# Patient Record
Sex: FEMALE | Race: WHITE | NOT HISPANIC OR LATINO | Employment: FULL TIME | URBAN - METROPOLITAN AREA
[De-identification: names, ages, dates, MRNs, and addresses within clinical notes are randomized per-mention and may not be internally consistent; named-entity substitution may affect disease eponyms.]

---

## 2017-04-03 ENCOUNTER — RECORDS - HEALTHEAST (OUTPATIENT)
Dept: LAB | Facility: CLINIC | Age: 57
End: 2017-04-03

## 2017-04-03 LAB
CHOLEST SERPL-MCNC: 199 MG/DL
FASTING STATUS PATIENT QL REPORTED: ABNORMAL
HDLC SERPL-MCNC: 70 MG/DL
LDLC SERPL CALC-MCNC: 71 MG/DL
TRIGL SERPL-MCNC: 291 MG/DL

## 2018-07-24 ENCOUNTER — RECORDS - HEALTHEAST (OUTPATIENT)
Dept: LAB | Facility: HOSPITAL | Age: 58
End: 2018-07-24

## 2018-07-25 LAB — LAMOTRIGINE SERPL-MCNC: 7.6 UG/ML (ref 2.5–15)

## 2019-07-31 ENCOUNTER — RECORDS - HEALTHEAST (OUTPATIENT)
Dept: LAB | Facility: HOSPITAL | Age: 59
End: 2019-07-31

## 2019-08-02 LAB — LAMOTRIGINE SERPL-MCNC: 8.7 UG/ML (ref 2.5–15)

## 2020-06-01 ENCOUNTER — RECORDS - HEALTHEAST (OUTPATIENT)
Dept: LAB | Facility: CLINIC | Age: 60
End: 2020-06-01

## 2020-06-01 LAB
ALBUMIN SERPL-MCNC: 4 G/DL (ref 3.5–5)
ALP SERPL-CCNC: 58 U/L (ref 45–120)
ALT SERPL W P-5'-P-CCNC: <9 U/L (ref 0–45)
ANION GAP SERPL CALCULATED.3IONS-SCNC: 10 MMOL/L (ref 5–18)
AST SERPL W P-5'-P-CCNC: 12 U/L (ref 0–40)
BILIRUB SERPL-MCNC: 0.2 MG/DL (ref 0–1)
BUN SERPL-MCNC: 14 MG/DL (ref 8–22)
CALCIUM SERPL-MCNC: 9.3 MG/DL (ref 8.5–10.5)
CHLORIDE BLD-SCNC: 105 MMOL/L (ref 98–107)
CHOLEST SERPL-MCNC: 216 MG/DL
CO2 SERPL-SCNC: 24 MMOL/L (ref 22–31)
CREAT SERPL-MCNC: 0.81 MG/DL (ref 0.6–1.1)
FASTING STATUS PATIENT QL REPORTED: ABNORMAL
GFR SERPL CREATININE-BSD FRML MDRD: >60 ML/MIN/1.73M2
GLUCOSE BLD-MCNC: 111 MG/DL (ref 70–125)
HDLC SERPL-MCNC: 79 MG/DL
LDLC SERPL CALC-MCNC: 112 MG/DL
POTASSIUM BLD-SCNC: 4.1 MMOL/L (ref 3.5–5)
PROT SERPL-MCNC: 6.5 G/DL (ref 6–8)
SODIUM SERPL-SCNC: 139 MMOL/L (ref 136–145)
TRIGL SERPL-MCNC: 125 MG/DL

## 2020-06-02 LAB — 25(OH)D3 SERPL-MCNC: 43.3 NG/ML (ref 30–80)

## 2020-07-07 ENCOUNTER — OFFICE VISIT (OUTPATIENT)
Dept: NEUROLOGY | Facility: CLINIC | Age: 60
End: 2020-07-07
Payer: COMMERCIAL

## 2020-07-07 VITALS
HEIGHT: 62 IN | SYSTOLIC BLOOD PRESSURE: 124 MMHG | BODY MASS INDEX: 23.19 KG/M2 | HEART RATE: 58 BPM | WEIGHT: 126 LBS | DIASTOLIC BLOOD PRESSURE: 75 MMHG

## 2020-07-07 DIAGNOSIS — G40.209 PARTIAL SYMPTOMATIC EPILEPSY WITH COMPLEX PARTIAL SEIZURES, NOT INTRACTABLE, WITHOUT STATUS EPILEPTICUS (H): Primary | ICD-10-CM

## 2020-07-07 PROBLEM — E78.5 HYPERLIPIDEMIA: Status: ACTIVE | Noted: 2020-07-07

## 2020-07-07 PROBLEM — K21.9 GASTROESOPHAGEAL REFLUX DISEASE: Status: ACTIVE | Noted: 2020-07-07

## 2020-07-07 PROCEDURE — 99214 OFFICE O/P EST MOD 30 MIN: CPT | Performed by: PSYCHIATRY & NEUROLOGY

## 2020-07-07 RX ORDER — LAMOTRIGINE 200 MG/1
200 TABLET ORAL 2 TIMES DAILY
Qty: 180 TABLET | Refills: 3 | Status: SHIPPED | OUTPATIENT
Start: 2020-07-07 | End: 2021-06-30

## 2020-07-07 RX ORDER — SIMVASTATIN 40 MG
40 TABLET ORAL AT BEDTIME
COMMUNITY
Start: 2020-05-27

## 2020-07-07 RX ORDER — LAMOTRIGINE 200 MG/1
TABLET ORAL
COMMUNITY
Start: 2019-07-31 | End: 2020-07-07

## 2020-07-07 ASSESSMENT — MIFFLIN-ST. JEOR: SCORE: 1104.78

## 2020-07-07 NOTE — LETTER
2020         RE: Yuliya Feliz  [address] [place] [address]        Dear Colleague,    Thank you for referring your patient, Yuliya Feliz, to the [facility]. Please see a copy of my visit note below.    NEUROLOGY FOLLOW UP VISIT  NOTE       [facility] NEUROLOGY [place]  [address] [place] [address]  Tel: [phone]  Fax: [phone]  [email or web address]     Yuliya Feliz,  1960, MRN [id number]  PCP: [place], [phone]  Date: 2020     ASSESSMENT & PLAN     Diagnosis code: Complex partial seizure     Complex partial seizure  Pleasant 60-year-old female with history of complex partial seizures well-controlled on current dose of Lamictal.  She has remained seizure-free since .  I have recommended:    Continue Lamictal 200 mg twice daily.  I gave her 90-day supply with 3 refills    Check Lamictal level    Follow-up in 1 year    Thank you again for this referral, please feel free to contact me if you have any questions.    Jim Hahn MD  [facility]  (Formerly, [facility]     HISTORY OF PRESENT ILLNESS     Patient is 60-year-old female with history of complex partial seizures who returns for yearly follow-up.  She is currently on Lamictal with good control of her seizure.  There is no history of loss of consciousness, tonic-clonic activity, automatism or losing touch with her surrounding.  Work-up in the past included a normal MRI but EEG showed left hemispheric sharp activity.  She has been on Lamictal without any major side effects or any breakthrough seizure.    Briefly patient is a female with a history of hyperlipidemia and gastroesophageal reflux who is being followed in our clinic for complex partial seizure disorder. Her symptoms started in  when she had a nocturnal seizure and at that time had MRI and EEG that were unremarkable. She was started on  Keppra for a short duration and afterwards was discontinued. She was doing fine until 2014 when she woke up, and around 8:40 a.m. she went into the bathroom, and afterwards her  heard her hitting the floor. He found her unconscious without significant tonic-clonic activity. She was unconscious for about 10 minutes and was confused. She was taken to the hospital and had a CT of the head that was unremarkable. EEG at that time showed dysrhythmia grade III left temporal and MRI of the head showed the left hippocampal body slightly smaller than the right and likely a normal variant. She was started on Lamictal with good control of seizures     PROBLEM LIST   Patient Active Problem List   Diagnosis Code     Epilepsy with partial complex seizures (H) G40.209     Gastroesophageal reflux disease K21.9     Hyperlipidemia E78.5         PAST MEDICAL & SURGICAL HISTORY     Past Medical History:   Patient  has a past medical history of Seizures (H).    Surgical History:  She  has a past surgical history that includes GYN surgery.     SOCIAL HISTORY     Reviewed, and she  reports that she has never smoked. She has never used smokeless tobacco. She reports previous alcohol use. She reports that she does not use drugs.     FAMILY HISTORY     Reviewed, and family history includes Alcoholism in her father; Cancer in her brother and mother; Parkinsonism in her paternal aunt; Seizure Disorder in her father.     ALLERGIES     Allergies   Allergen Reactions     Cephalexin      Latex      Naproxen          REVIEW OF SYSTEMS     A 12 point review of system was performed and was negative except as outlined in the history of present illness.     HOME MEDICATIONS       Current Outpatient Medications:      lamoTRIgine (LAMICTAL) 200 MG tablet, , Disp: , Rfl:      magnesium oxide (MAG-OX) 100 mg TABS quarter-tab, , Disp: , Rfl:      melatonin 1 MG CAPS, , Disp: , Rfl:      simvastatin (ZOCOR) 20 MG tablet, , Disp: , Rfl:       PHYSICAL  "EXAM     Vital signs  /75 (BP Location: Left arm, Patient Position: Sitting, Cuff Size: Adult Regular)   Pulse 58   Ht 1.575 m (5' 2\")   Wt 57.2 kg (126 lb)   BMI 23.05 kg/m      Weight:   126 lbs 0 oz    GENERAL PHYSICAL EXAM: Patient is alert and oriented x 4 in no acute distress. Neck was supple, no carotid bruits, thyromegaly, lymphadenopathy or JVD noted.     NEUROLOGICAL EXAM:  MENTAL STATUS  Patient is A&O x 4. Patient recalls 3/3 objects at 5 minutes.  SPEECH  Speech is clear and fluent with good repetition, comprehension, and naming both for objects and parts of an object. Written and verbal comprehension is intact.  CRANIAL NERVES  CN II: Visual fields are full to confrontation. Fundoscopic exam is normal with sharp discs and no vascular changes. Venous pulsations are present bilaterally. Pupils are equal and reactive to light.   CN III, IV, VI: EOMI, PERRLA  CN V: Facial sensation is intact to pinprick in all 3 divisions bilaterally. Corneal responses are intact.  CN VII: Face is symmetric with normal eye closure and smile.  CN VII: Hearing is normal to rubbing fingers  CN IX, X: Palate elevates symmetrically. Phonation is normal.  CN XI: Head turning and shoulder shrug are intact  CN XII: Tongue is midline with normal movements and no atrophy.  MOTOR  Muscle bulk and tone are normal. No pronator drift. Strength is 5/5 bilaterally. No fasciculations noted.  REFLEXES  Reflexes are symmetric. Plantar responses are flexor.  SENSORY  Light touch, pinprick, position sense, and vibration sense are intact bilaterally. No astereognosia, agraphesthesia or extinction to bilateral simultaneous stimulation.  COORDINATION  Rapid alternating movements and fine finger movements are intact. No dysmetria on FNF and HKS. Romberg negative.  GAIT  Posture is normal.Tandem gait is normal. Able to walk on toes and heels.     DIAGNOSTIC STUDIES     PERTINENT RADIOLOGY  Following imaging studies were reviewed "     (05/10/2017 09:02 CDT MRI Report)  1. No recent infarct, intracranial mass or abnormal intracranial enhancement.  2. No structural or migrational abnormality identified.  3. Within the limits of motion on the study, the mesial temporal structures are within normal limits bilaterally.  4. Stable mild number of small foci of nonspecific nonenhancing T2 signal changes in the supratentorial white matter which is not atypical in a patient of this age. These may reflect foci of nonspecific gliosis or chronic myelin loss or mild chronic small vessel ischemic changes.    EEG 5/9/17: This is abnormal sleep deprived EEG due to paroxysmal slowing of the background in theta range that is maximally expressed in the left hemisphere and suggests nonspecific cerebral dysfunction with predominantly left hemispheric pathology. No sharp activity or rhythmic discharges were seen.    PERTINENT LABS  Following labs were reviewed  Result Name Current Result Reference Range   Lamotrigine (Lamictal) Level (ug/mL)  8.7 7/31/2019 2.5 - 15.0            Total time spent for face to face visit, reviewing labs/imaging studies, counseling and coordination of care was: 30 Minutes More than 50% of this time was spent on counseling and coordination of care.      This note was dictated using voice recognition software.  Any grammatical or context distortions are unintentional and inherent to the software.               Again, thank you for allowing me to participate in the care of your patient.        Sincerely,        Jim Hahn MD

## 2020-07-07 NOTE — PATIENT INSTRUCTIONS
Patient Education     Diagnosing Epilepsy    Your primary healthcare provider may be the first healthcare provider to evaluate you for epilepsy. He or she may then refer you to a specialist for further evaluation. This specialist may be a neurologist (a healthcare provider who treats the brain), or an epileptologist, a neurologist who specializes in seizure disorders. Your evaluation will include a health history, physical and neurologic exams, and tests. An epilepsy diagnosis is typically made when someone has more than 1 seizure that occurs for no apparent reason (an unprovoked seizure).  Health history  This is the most important part of your evaluation. The healthcare provider will ask you to describe your seizures. The healthcare provider may also want to talk to family or friends who have seen your seizures. In addition, your healthcare provider will ask about your risk factors. These are things that make you more likely to have epilepsy, and include:    Being born before your due date (premature birth)    Oxygen deprivation during birth     A family history of epilepsy    Past nervous system infection, like meningitis    A previous head or brain injury    Past stroke or brain tumor    A history of febrile seizures (childhood seizures caused by high fever)    Use of illegal drugs or alcohol    Certain genetic disorders    Alcohol abuse or withdrawal    Alzheimer disease    Gluten intolerance or celiac disease    Hydrocephalus or an abnormal buildup of fluid around the brain    Withdrawal of antiepileptic medicines, even when they are used for other conditions, such as gabapentin for pain   Physical and neurologic exams  The physical exam checks your overall health. Your pulse, blood pressure, and temperature are taken. The neurologic exam checks certain functions of your brain. These include reflexes, balance, muscle strength, and coordination. Mental skills, like language and memory, and nerve function of  the body are also checked.  Tests for epilepsy  After the exams are done, the healthcare provider may order some tests. Electroencephalogram (EEG) and MRI are the most common tests used to support a diagnosis of epilepsy.  Electroencephalogram (EEG)  An EEG records electrical activity in the brain. It can show abnormal signals that may mean seizure activity. In some cases, it can point to the area of the brain where seizures might start.  Imaging tests  Imaging tests may be used to create detailed pictures of the brain. These tests include MRI and CT scans.  Blood tests and other tests  You may have a sample of blood taken and tested. Other tests may also be done. These tests can help rule out certain health problems or provide more information.  Date Last Reviewed: 11/1/2017 2000-2019 [employer]. [address] [place] [address], [place] [address]. All rights reserved. This information is not intended as a substitute for professional medical care. Always follow your healthcare professional's instructions.

## 2020-07-07 NOTE — LETTER
July 10, 2020      Yuliya Feliz  [address] [place] [address]        Dear ,    We are writing to inform you of your test results.    Your test results fall within the expected range(s) or remain unchanged from previous results.  Please continue with current treatment plan.     Ref Range & Units  2d ago 11mo ago   Lamotrigine  2.5 - 15.0 ug/mL  9.6   8.7 CM      If you have any questions or concerns, please call the clinic at the number listed above.       Sincerely,        Jim Hahn MD

## 2020-07-07 NOTE — PROGRESS NOTES
NEUROLOGY FOLLOW UP VISIT  NOTE       [facility] NEUROLOGY [place]  [address] [place] [address]  Tel: [phone]  Fax: [phone]  [email or web address]     Yuliya Feliz, [place] 1960, MRN [id number]  PCP: [place], [phone]  Date: 2020     ASSESSMENT & PLAN     Diagnosis code: Complex partial seizure     Complex partial seizure  Pleasant 60-year-old female with history of complex partial seizures well-controlled on current dose of Lamictal.  She has remained seizure-free since .  I have recommended:    Continue Lamictal 200 mg twice daily.  I gave her 90-day supply with 3 refills    Check Lamictal level    Follow-up in 1 year    Thank you again for this referral, please feel free to contact me if you have any questions.    Jim Hahn MD  [facility]  (Former[facility], [facility]     HISTORY OF PRESENT ILLNESS     Patient is 60-year-old female with history of complex partial seizures who returns for yearly follow-up.  She is currently on Lamictal with good control of her seizure.  There is no history of loss of consciousness, tonic-clonic activity, automatism or losing touch with her surrounding.  Work-up in the past included a normal MRI but EEG showed left hemispheric sharp activity.  She has been on Lamictal without any major side effects or any breakthrough seizure.    Briefly patient is a female with a history of hyperlipidemia and gastroesophageal reflux who is being followed in our clinic for complex partial seizure disorder. Her symptoms started in  when she had a nocturnal seizure and at that time had MRI and EEG that were unremarkable. She was started on Keppra for a short duration and afterwards was discontinued. She was doing fine until  when she woke up, and around 8:40 a.m. she went into the bathroom, and afterwards her  heard her hitting the floor. He found her unconscious without  "significant tonic-clonic activity. She was unconscious for about 10 minutes and was confused. She was taken to the hospital and had a CT of the head that was unremarkable. EEG at that time showed dysrhythmia grade III left temporal and MRI of the head showed the left hippocampal body slightly smaller than the right and likely a normal variant. She was started on Lamictal with good control of seizures     PROBLEM LIST   Patient Active Problem List   Diagnosis Code     Epilepsy with partial complex seizures (H) G40.209     Gastroesophageal reflux disease K21.9     Hyperlipidemia E78.5         PAST MEDICAL & SURGICAL HISTORY     Past Medical History:   Patient  has a past medical history of Seizures (H).    Surgical History:  She  has a past surgical history that includes GYN surgery.     SOCIAL HISTORY     Reviewed, and she  reports that she has never smoked. She has never used smokeless tobacco. She reports previous alcohol use. She reports that she does not use drugs.     FAMILY HISTORY     Reviewed, and family history includes Alcoholism in her father; Cancer in her brother and mother; Parkinsonism in her paternal aunt; Seizure Disorder in her father.     ALLERGIES     Allergies   Allergen Reactions     Cephalexin      Latex      Naproxen          REVIEW OF SYSTEMS     A 12 point review of system was performed and was negative except as outlined in the history of present illness.     HOME MEDICATIONS       Current Outpatient Medications:      lamoTRIgine (LAMICTAL) 200 MG tablet, , Disp: , Rfl:      magnesium oxide (MAG-OX) 100 mg TABS quarter-tab, , Disp: , Rfl:      melatonin 1 MG CAPS, , Disp: , Rfl:      simvastatin (ZOCOR) 20 MG tablet, , Disp: , Rfl:       PHYSICAL EXAM     Vital signs  /75 (BP Location: Left arm, Patient Position: Sitting, Cuff Size: Adult Regular)   Pulse 58   Ht 1.575 m (5' 2\")   Wt 57.2 kg (126 lb)   BMI 23.05 kg/m      Weight:   126 lbs 0 oz    GENERAL PHYSICAL EXAM: Patient is " alert and oriented x 4 in no acute distress. Neck was supple, no carotid bruits, thyromegaly, lymphadenopathy or JVD noted.     NEUROLOGICAL EXAM:  MENTAL STATUS  Patient is A&O x 4. Patient recalls 3/3 objects at 5 minutes.  SPEECH  Speech is clear and fluent with good repetition, comprehension, and naming both for objects and parts of an object. Written and verbal comprehension is intact.  CRANIAL NERVES  CN II: Visual fields are full to confrontation. Fundoscopic exam is normal with sharp discs and no vascular changes. Venous pulsations are present bilaterally. Pupils are equal and reactive to light.   CN III, IV, VI: EOMI, PERRLA  CN V: Facial sensation is intact to pinprick in all 3 divisions bilaterally. Corneal responses are intact.  CN VII: Face is symmetric with normal eye closure and smile.  CN VII: Hearing is normal to rubbing fingers  CN IX, X: Palate elevates symmetrically. Phonation is normal.  CN XI: Head turning and shoulder shrug are intact  CN XII: Tongue is midline with normal movements and no atrophy.  MOTOR  Muscle bulk and tone are normal. No pronator drift. Strength is 5/5 bilaterally. No fasciculations noted.  REFLEXES  Reflexes are symmetric. Plantar responses are flexor.  SENSORY  Light touch, pinprick, position sense, and vibration sense are intact bilaterally. No astereognosia, agraphesthesia or extinction to bilateral simultaneous stimulation.  COORDINATION  Rapid alternating movements and fine finger movements are intact. No dysmetria on FNF and HKS. Romberg negative.  GAIT  Posture is normal.Tandem gait is normal. Able to walk on toes and heels.     DIAGNOSTIC STUDIES     PERTINENT RADIOLOGY  Following imaging studies were reviewed     (05/10/2017 09:02 CDT MRI Report)  1. No recent infarct, intracranial mass or abnormal intracranial enhancement.  2. No structural or migrational abnormality identified.  3. Within the limits of motion on the study, the mesial temporal structures are  within normal limits bilaterally.  4. Stable mild number of small foci of nonspecific nonenhancing T2 signal changes in the supratentorial white matter which is not atypical in a patient of this age. These may reflect foci of nonspecific gliosis or chronic myelin loss or mild chronic small vessel ischemic changes.    EEG 5/9/17: This is abnormal sleep deprived EEG due to paroxysmal slowing of the background in theta range that is maximally expressed in the left hemisphere and suggests nonspecific cerebral dysfunction with predominantly left hemispheric pathology. No sharp activity or rhythmic discharges were seen.    PERTINENT LABS  Following labs were reviewed  Result Name Current Result Reference Range   Lamotrigine (Lamictal) Level (ug/mL)  8.7 7/31/2019 2.5 - 15.0            Total time spent for face to face visit, reviewing labs/imaging studies, counseling and coordination of care was: 30 Minutes More than 50% of this time was spent on counseling and coordination of care.      This note was dictated using voice recognition software.  Any grammatical or context distortions are unintentional and inherent to the software.

## 2020-07-08 ENCOUNTER — RECORDS - HEALTHEAST (OUTPATIENT)
Dept: LAB | Facility: HOSPITAL | Age: 60
End: 2020-07-08

## 2020-07-09 LAB — LAMOTRIGINE SERPL-MCNC: 9.6 UG/ML (ref 2.5–15)

## 2020-07-13 ENCOUNTER — RECORDS - HEALTHEAST (OUTPATIENT)
Dept: LAB | Facility: CLINIC | Age: 60
End: 2020-07-13

## 2020-07-13 LAB
ALBUMIN SERPL-MCNC: 4.1 G/DL (ref 3.5–5)
ALP SERPL-CCNC: 59 U/L (ref 45–120)
ALT SERPL W P-5'-P-CCNC: 10 U/L (ref 0–45)
ANION GAP SERPL CALCULATED.3IONS-SCNC: 8 MMOL/L (ref 5–18)
AST SERPL W P-5'-P-CCNC: 11 U/L (ref 0–40)
BILIRUB SERPL-MCNC: 0.3 MG/DL (ref 0–1)
BUN SERPL-MCNC: 8 MG/DL (ref 8–22)
CALCIUM SERPL-MCNC: 9.4 MG/DL (ref 8.5–10.5)
CHLORIDE BLD-SCNC: 100 MMOL/L (ref 98–107)
CO2 SERPL-SCNC: 28 MMOL/L (ref 22–31)
CREAT SERPL-MCNC: 0.78 MG/DL (ref 0.6–1.1)
GFR SERPL CREATININE-BSD FRML MDRD: >60 ML/MIN/1.73M2
GLUCOSE BLD-MCNC: 97 MG/DL (ref 70–125)
LIPASE SERPL-CCNC: 19 U/L (ref 0–52)
POTASSIUM BLD-SCNC: 4.4 MMOL/L (ref 3.5–5)
PROT SERPL-MCNC: 6.4 G/DL (ref 6–8)
SODIUM SERPL-SCNC: 136 MMOL/L (ref 136–145)
TSH SERPL DL<=0.005 MIU/L-ACNC: 0.68 UIU/ML (ref 0.3–5)

## 2020-07-15 ENCOUNTER — RECORDS - HEALTHEAST (OUTPATIENT)
Dept: LAB | Facility: CLINIC | Age: 60
End: 2020-07-15

## 2020-07-16 LAB
O+P STL MICRO: NORMAL
SHIGA TOXIN 1: NEGATIVE
SHIGA TOXIN 2: NEGATIVE

## 2020-07-17 LAB — H PYLORI AG STL QL IA: NEGATIVE

## 2020-07-18 LAB — BACTERIA SPEC CULT: NORMAL

## 2020-09-29 ENCOUNTER — TELEPHONE (OUTPATIENT)
Dept: NEUROLOGY | Facility: CLINIC | Age: 60
End: 2020-09-29

## 2020-09-29 NOTE — TELEPHONE ENCOUNTER
Pt lm late yesterday that she has numbness in the left side of her face and is falling a lot. [phone]

## 2020-09-30 NOTE — TELEPHONE ENCOUNTER
Unable to reach patient. I have left her a message asking to please call us back.  Glo Foreman CMA on 9/30/2020 at 9:34 AM

## 2021-03-25 ENCOUNTER — TELEPHONE (OUTPATIENT)
Dept: NEUROLOGY | Facility: CLINIC | Age: 61
End: 2021-03-25

## 2021-03-25 NOTE — TELEPHONE ENCOUNTER
Pt's  calling to ask if the COVID vaccine is safe for pt. Pt has epilepsy, and she had heard that there have been instances where people with epilepsy have had seizures after getting the vaccine. [phone] ok to lm

## 2021-03-25 NOTE — TELEPHONE ENCOUNTER
Called Gill and left message OK to get COVID vaccine   Glo Foreman, SUYAPA on 3/25/2021 at 10:53 AM

## 2021-06-02 ENCOUNTER — RECORDS - HEALTHEAST (OUTPATIENT)
Dept: ADMINISTRATIVE | Facility: CLINIC | Age: 61
End: 2021-06-02

## 2021-06-28 ENCOUNTER — TELEPHONE (OUTPATIENT)
Dept: NEUROLOGY | Facility: CLINIC | Age: 61
End: 2021-06-28

## 2021-06-28 NOTE — TELEPHONE ENCOUNTER
Pt's  Johny called to ask if using CBD oil is safe with Lamictal. Pt is having trouble sleeping, and wants to use it for relaxation and pain. He also asked a CBD personal lube would be ok for vaginal atrophy. [phone]

## 2021-06-29 NOTE — TELEPHONE ENCOUNTER
CBD oil can increase the level of lamotrigine.  She needs to talk to her primary care physician about vaginal atrophy and treatment option

## 2021-06-29 NOTE — TELEPHONE ENCOUNTER
"Johny called to add that it is ok to lm. I did inform him of Dr. Hahn\"s response. He understood.  "

## 2021-06-30 DIAGNOSIS — G40.209 PARTIAL SYMPTOMATIC EPILEPSY WITH COMPLEX PARTIAL SEIZURES, NOT INTRACTABLE, WITHOUT STATUS EPILEPTICUS (H): ICD-10-CM

## 2021-06-30 RX ORDER — LAMOTRIGINE 200 MG/1
TABLET ORAL
Qty: 60 TABLET | Refills: 0 | Status: SHIPPED | OUTPATIENT
Start: 2021-06-30 | End: 2021-07-12

## 2021-06-30 NOTE — TELEPHONE ENCOUNTER
Refill request for Lamictal  Appt scheduled for 7/12/21  Medication T'd for review and signature  Glo Foreman CMA on 6/30/2021 at 11:38 AM

## 2021-07-12 ENCOUNTER — OFFICE VISIT (OUTPATIENT)
Dept: NEUROLOGY | Facility: CLINIC | Age: 61
End: 2021-07-12
Payer: COMMERCIAL

## 2021-07-12 ENCOUNTER — LAB (OUTPATIENT)
Dept: LAB | Facility: HOSPITAL | Age: 61
End: 2021-07-12
Payer: COMMERCIAL

## 2021-07-12 VITALS
HEART RATE: 57 BPM | BODY MASS INDEX: 22.6 KG/M2 | WEIGHT: 122.8 LBS | SYSTOLIC BLOOD PRESSURE: 119 MMHG | DIASTOLIC BLOOD PRESSURE: 63 MMHG | HEIGHT: 62 IN

## 2021-07-12 DIAGNOSIS — G40.209 PARTIAL SYMPTOMATIC EPILEPSY WITH COMPLEX PARTIAL SEIZURES, NOT INTRACTABLE, WITHOUT STATUS EPILEPTICUS (H): ICD-10-CM

## 2021-07-12 DIAGNOSIS — G40.209 PARTIAL SYMPTOMATIC EPILEPSY WITH COMPLEX PARTIAL SEIZURES, NOT INTRACTABLE, WITHOUT STATUS EPILEPTICUS (H): Primary | ICD-10-CM

## 2021-07-12 DIAGNOSIS — M47.812 CERVICAL SPONDYLOSIS WITHOUT MYELOPATHY: ICD-10-CM

## 2021-07-12 LAB
ALBUMIN SERPL-MCNC: 4 G/DL (ref 3.5–5)
ALP SERPL-CCNC: 50 U/L (ref 45–120)
ALT SERPL W P-5'-P-CCNC: 14 U/L (ref 0–45)
AST SERPL W P-5'-P-CCNC: 14 U/L (ref 0–40)
BILIRUB DIRECT SERPL-MCNC: 0.1 MG/DL
BILIRUB SERPL-MCNC: 0.4 MG/DL (ref 0–1)
PROT SERPL-MCNC: 6.7 G/DL (ref 6–8)

## 2021-07-12 PROCEDURE — 82040 ASSAY OF SERUM ALBUMIN: CPT

## 2021-07-12 PROCEDURE — 99215 OFFICE O/P EST HI 40 MIN: CPT | Performed by: PSYCHIATRY & NEUROLOGY

## 2021-07-12 PROCEDURE — 80076 HEPATIC FUNCTION PANEL: CPT

## 2021-07-12 PROCEDURE — 36415 COLL VENOUS BLD VENIPUNCTURE: CPT

## 2021-07-12 PROCEDURE — 80175 DRUG SCREEN QUAN LAMOTRIGINE: CPT

## 2021-07-12 RX ORDER — LAMOTRIGINE 200 MG/1
200 TABLET ORAL 2 TIMES DAILY
Qty: 180 TABLET | Refills: 3 | Status: SHIPPED | OUTPATIENT
Start: 2021-07-12 | End: 2022-07-14

## 2021-07-12 RX ORDER — LECITHIN 1200 MG
CAPSULE ORAL
COMMUNITY

## 2021-07-12 ASSESSMENT — MIFFLIN-ST. JEOR: SCORE: 1085.27

## 2021-07-12 NOTE — PROGRESS NOTES
NEUROLOGY FOLLOW UP VISIT  NOTE       [facility] NEUROLOGY [place]  [address] [place] [address]  Tel: [phone]  Fax: [phone]  [email or web address]     Yuliya Feliz,  1960, MRN [id number]  PCP: [place]  Date: 2021      ASSESSMENT & PLAN     Diagnosis code  1. Partial symptomatic epilepsy with complex partial seizures, not intractable, without status epilepticus (H)     Complex partial seizure (dysrhythmia grade 3 left temporal)  Pleasant 61-year-old female with history of HLD, GERD and complex partial seizures who returns for yearly follow-up.  Her seizures are well controlled on current dose of lamotrigine.  I have refilled her prescriptions gave her 90-day supply with 3 refills.  I am checking hepatic profile and lamotrigine level.  Regular follow-up will be in 1 year.    Cervical spondylosis  Lately patient has been experiencing generalized muscle twitches along with incoordination.  She also reports some neck pain and on exam has significant hyperreflexia that is concerning for cervical spondylosis.  I have recommended checking MRI of cervical spine.  Further recommendation will be based on MRI findings    Thank you again for this referral, please feel free to contact me if you have any questions.    Jim Hahn MD  [facility]  (Former[facility], [facility]     HISTORY OF PRESENT ILLNESS     Patient is a pleasant 61-year-old female with history of HLD, GERD and complex partial seizures who is been followed in our clinic since  who returns for follow-up.  Her symptoms are well controlled on lamotrigine.  Since her last visit she denies any loss of consciousness, tonic-clonic activity or unexplained loss of consciousness.  Work-up in the past included a normal MRI scan but EEG showed left hemispheric sharp activity.  She was started on lamotrigine with no breakthrough seizures.  Her major  complaint today is neck pain along with involuntary muscles.  She has difficulty with coordination.  She denies any weakness or any bowel or bladder incontinence.    Briefly patient is a female with a history of hyperlipidemia and gastroesophageal reflux who is being followed in our clinic for complex partial seizure disorder. Her symptoms started in 2010 when she had a nocturnal seizure and at that time had MRI and EEG that were unremarkable. She was started on Keppra for a short duration and afterwards was discontinued. She was doing fine until 2014 when she woke up, and around 8:40 a.m. she went into the bathroom, and afterwards her  heard her hitting the floor. He found her unconscious without significant tonic-clonic activity. She was unconscious for about 10 minutes and was confused. She was taken to the hospital and had a CT of the head that was unremarkable. EEG at that time showed dysrhythmia grade III left temporal and MRI of the head showed the left hippocampal body slightly smaller than the right and likely a normal variant. She was started on Lamictal with good control of seizures     PROBLEM LIST   Patient Active Problem List   Diagnosis Code     Complex Partial Seizure (Dysrhythmia III Lt. Temporal) G40.209     Gastroesophageal reflux disease K21.9     Hyperlipidemia E78.5         PAST MEDICAL & SURGICAL HISTORY     Past Medical History:   Patient  has a past medical history of Seizures (H).    Surgical History:  She  has a past surgical history that includes GYN surgery.     SOCIAL HISTORY     Reviewed, and she  reports that she has never smoked. She has never used smokeless tobacco. She reports previous alcohol use. She reports that she does not use drugs.     FAMILY HISTORY     Reviewed, and family history includes Alcoholism in her father; Cancer in her brother and mother; Parkinsonism in her paternal aunt; Seizure Disorder in her father.     ALLERGIES     Allergies   Allergen Reactions      "Cephalexin      Latex      Naproxen          REVIEW OF SYSTEMS     A 12 point review of system was performed and was negative except as outlined in the history of present illness.     HOME MEDICATIONS     Current Outpatient Rx   Medication Sig Dispense Refill     Black Cohosh 160 MG CAPS        CALCIUM PO        Cholecalciferol (VITAMIN D3 PO)   0 Refill(s), Type: Maintenance       lamoTRIgine (LAMICTAL) 200 MG tablet Take 1 tablet (200 mg) by mouth 2 times daily 180 tablet 3     magnesium oxide (MAG-OX) 100 mg TABS quarter-tab        melatonin 1 MG CAPS        simvastatin (ZOCOR) 20 MG tablet        TURMERIC PO        VITAMIN B COMPLEX-C PO            PHYSICAL EXAM     Vital signs  /63 (BP Location: Left arm, Patient Position: Sitting)   Pulse 57   Ht 1.575 m (5' 2\")   Wt 55.7 kg (122 lb 12.8 oz)   BMI 22.46 kg/m      Weight:   122 lbs 12.8 oz    Patient is alert and oriented x4 in no acute distress. Vital signs were reviewed and are documented in electronic medical record. Neck was supple, no carotid bruits, thyromegaly, JVD, or lymphadenopathy was noted.   NEUROLOGY EXAM:    Patient s speech was normal with no aphasia or dysarthria. Mentation, and affect were also normal.     Funduscopic exam was normal, with normal cup to disc ratio. Cranial nerves II -XII were intact.     Patient had normal mass, tone and motor strength was 5/5 in all extremities without pronator drift.     Sensation was intact to light touch, pinprick, and vibratory sensation.     Reflexes were 3+ symmetrical with downgoing toes.     No dysmetria noted on FNF or HKS. Romberg was negative.    Gait testing was normal. Able to walk on toes/heels. Tandem walk normal.     DIAGNOSTIC STUDIES     PERTINENT RADIOLOGY  Following imaging studies were reviewed:     (05/10/2017 09:02 CDT MRI Report)  1. No recent infarct, intracranial mass or abnormal intracranial enhancement.  2. No structural or migrational abnormality identified.  3. Within " the limits of motion on the study, the mesial temporal structures are within normal limits bilaterally.  4. Stable mild number of small foci of nonspecific nonenhancing T2 signal changes in the supratentorial white matter which is not atypical in a patient of this age. These may reflect foci of nonspecific gliosis or chronic myelin loss or mild chronic small vessel ischemic changes.    EEG 5/9/17: This is abnormal sleep deprived EEG due to paroxysmal slowing of the background in theta range that is maximally expressed in the left hemisphere and suggests nonspecific cerebral dysfunction with predominantly left hemispheric pathology. No sharp activity or rhythmic discharges were seen.     PERTINENT LABS  Following labs were reviewed:  No visits with results within 3 Month(s) from this visit.   Latest known visit with results is:   Records - [facility] on 07/15/2020   Component Date Value     H Pylori Antigen 07/15/2020 Negative      Shiga Toxin 1 07/15/2020 Negative      Shiga Toxin 2 07/15/2020 Negative         Ref Range & Units 1 yr ago   (7/8/20) 1 yr ago   (7/31/19)    Lamotrigine 2.5 - 15.0 ug/mL 9.6  8.7 CM         Total time spent for face to face visit, reviewing labs/imaging studies, counseling and coordination of care was: 45 Minutes spent on the date of the encounter doing chart review, review of outside records, review of test results, interpretation of tests, patient visit and documentation       This note was dictated using voice recognition software.  Any grammatical or context distortions are unintentional and inherent to the software.    Orders Placed This Encounter   Procedures     MR Cervical Spine w/o Contrast     Lamotrigine Level     Hepatic function panel      New Prescriptions    No medications on file     Modified Medications    Modified Medication Previous Medication    LAMOTRIGINE (LAMICTAL) 200 MG TABLET lamoTRIgine (LAMICTAL) 200 MG tablet       Take 1 tablet (200 mg) by mouth 2 times  daily    TAKE ONE TABLET BY MOUTH TWICE DAILY

## 2021-07-12 NOTE — LETTER
2021         RE: Yuliya Feliz  [address] [place] [address]        Dear Colleague,    Thank you for referring your patient, Yuliya Feliz, to the [facility] NEUROLOGY [facility] [place]. Please see a copy of my visit note below.    NEUROLOGY FOLLOW UP VISIT  NOTE       [facility] NEUROLOGY [place]  [address] [place] [address]  Tel: [phone]  Fax: [phone]  [email or web address]     Yuliya Feliz,  1960, MRN [id number]  PCP: [place]  Date: 2021      ASSESSMENT & PLAN     Diagnosis code  1. Partial symptomatic epilepsy with complex partial seizures, not intractable, without status epilepticus (H)     Complex partial seizure (dysrhythmia grade 3 left temporal)  Pleasant 61-year-old female with history of HLD, GERD and complex partial seizures who returns for yearly follow-up.  Her seizures are well controlled on current dose of lamotrigine.  I have refilled her prescriptions gave her 90-day supply with 3 refills.  I am checking hepatic profile and lamotrigine level.  Regular follow-up will be in 1 year.    Cervical spondylosis  Lately patient has been experiencing generalized muscle twitches along with incoordination.  She also reports some neck pain and on exam has significant hyperreflexia that is concerning for cervical spondylosis.  I have recommended checking MRI of cervical spine.  Further recommendation will be based on MRI findings    Thank you again for this referral, please feel free to contact me if you have any questions.    Jim Hahn MD  [facility]  ([facility], [facility]     HISTORY OF PRESENT ILLNESS     Patient is a pleasant 61-year-old female with history of HLD, GERD and complex partial seizures who is been followed in our clinic since  who returns for follow-up.  Her symptoms are well controlled on lamotrigine.  Since her last visit she denies any loss  of consciousness, tonic-clonic activity or unexplained loss of consciousness.  Work-up in the past included a normal MRI scan but EEG showed left hemispheric sharp activity.  She was started on lamotrigine with no breakthrough seizures.  Her major complaint today is neck pain along with involuntary muscles.  She has difficulty with coordination.  She denies any weakness or any bowel or bladder incontinence.    Briefly patient is a female with a history of hyperlipidemia and gastroesophageal reflux who is being followed in our clinic for complex partial seizure disorder. Her symptoms started in 2010 when she had a nocturnal seizure and at that time had MRI and EEG that were unremarkable. She was started on Keppra for a short duration and afterwards was discontinued. She was doing fine until 2014 when she woke up, and around 8:40 a.m. she went into the bathroom, and afterwards her  heard her hitting the floor. He found her unconscious without significant tonic-clonic activity. She was unconscious for about 10 minutes and was confused. She was taken to the hospital and had a CT of the head that was unremarkable. EEG at that time showed dysrhythmia grade III left temporal and MRI of the head showed the left hippocampal body slightly smaller than the right and likely a normal variant. She was started on Lamictal with good control of seizures     PROBLEM LIST   Patient Active Problem List   Diagnosis Code     Complex Partial Seizure (Dysrhythmia III Lt. Temporal) G40.209     Gastroesophageal reflux disease K21.9     Hyperlipidemia E78.5         PAST MEDICAL & SURGICAL HISTORY     Past Medical History:   Patient  has a past medical history of Seizures (H).    Surgical History:  She  has a past surgical history that includes GYN surgery.     SOCIAL HISTORY     Reviewed, and she  reports that she has never smoked. She has never used smokeless tobacco. She reports previous alcohol use. She reports that she does not use  "drugs.     FAMILY HISTORY     Reviewed, and family history includes Alcoholism in her father; Cancer in her brother and mother; Parkinsonism in her paternal aunt; Seizure Disorder in her father.     ALLERGIES     Allergies   Allergen Reactions     Cephalexin      Latex      Naproxen          REVIEW OF SYSTEMS     A 12 point review of system was performed and was negative except as outlined in the history of present illness.     HOME MEDICATIONS     Current Outpatient Rx   Medication Sig Dispense Refill     Black Cohosh 160 MG CAPS        CALCIUM PO        Cholecalciferol (VITAMIN D3 PO)   0 Refill(s), Type: Maintenance       lamoTRIgine (LAMICTAL) 200 MG tablet Take 1 tablet (200 mg) by mouth 2 times daily 180 tablet 3     magnesium oxide (MAG-OX) 100 mg TABS quarter-tab        melatonin 1 MG CAPS        simvastatin (ZOCOR) 20 MG tablet        TURMERIC PO        VITAMIN B COMPLEX-C PO            PHYSICAL EXAM     Vital signs  /63 (BP Location: Left arm, Patient Position: Sitting)   Pulse 57   Ht 1.575 m (5' 2\")   Wt 55.7 kg (122 lb 12.8 oz)   BMI 22.46 kg/m      Weight:   122 lbs 12.8 oz    Patient is alert and oriented x4 in no acute distress. Vital signs were reviewed and are documented in electronic medical record. Neck was supple, no carotid bruits, thyromegaly, JVD, or lymphadenopathy was noted.   NEUROLOGY EXAM:    Patient s speech was normal with no aphasia or dysarthria. Mentation, and affect were also normal.     Funduscopic exam was normal, with normal cup to disc ratio. Cranial nerves II -XII were intact.     Patient had normal mass, tone and motor strength was 5/5 in all extremities without pronator drift.     Sensation was intact to light touch, pinprick, and vibratory sensation.     Reflexes were 3+ symmetrical with downgoing toes.     No dysmetria noted on FNF or HKS. Romberg was negative.    Gait testing was normal. Able to walk on toes/heels. Tandem walk normal.     DIAGNOSTIC STUDIES "     PERTINENT RADIOLOGY  Following imaging studies were reviewed:     (05/10/2017 09:02 CDT MRI Report)  1. No recent infarct, intracranial mass or abnormal intracranial enhancement.  2. No structural or migrational abnormality identified.  3. Within the limits of motion on the study, the mesial temporal structures are within normal limits bilaterally.  4. Stable mild number of small foci of nonspecific nonenhancing T2 signal changes in the supratentorial white matter which is not atypical in a patient of this age. These may reflect foci of nonspecific gliosis or chronic myelin loss or mild chronic small vessel ischemic changes.    EEG 5/9/17: This is abnormal sleep deprived EEG due to paroxysmal slowing of the background in theta range that is maximally expressed in the left hemisphere and suggests nonspecific cerebral dysfunction with predominantly left hemispheric pathology. No sharp activity or rhythmic discharges were seen.     PERTINENT LABS  Following labs were reviewed:  No visits with results within 3 Month(s) from this visit.   Latest known visit with results is:   Records - [facility] on 07/15/2020   Component Date Value     H Pylori Antigen 07/15/2020 Negative      Shiga Toxin 1 07/15/2020 Negative      Shiga Toxin 2 07/15/2020 Negative         Ref Range & Units 1 yr ago   (7/8/20) 1 yr ago   (7/31/19)    Lamotrigine 2.5 - 15.0 ug/mL 9.6  8.7 CM         Total time spent for face to face visit, reviewing labs/imaging studies, counseling and coordination of care was: 45 Minutes spent on the date of the encounter doing chart review, review of outside records, review of test results, interpretation of tests, patient visit and documentation       This note was dictated using voice recognition software.  Any grammatical or context distortions are unintentional and inherent to the software.    Orders Placed This Encounter   Procedures     MR Cervical Spine w/o Contrast     Lamotrigine Level     Hepatic function  panel      New Prescriptions    No medications on file     Modified Medications    Modified Medication Previous Medication    LAMOTRIGINE (LAMICTAL) 200 MG TABLET lamoTRIgine (LAMICTAL) 200 MG tablet       Take 1 tablet (200 mg) by mouth 2 times daily    TAKE ONE TABLET BY MOUTH TWICE DAILY                      Again, thank you for allowing me to participate in the care of your patient.        Sincerely,        Jim Hahn MD

## 2021-07-15 LAB — LAMOTRIGINE SERPL-MCNC: 9.2 UG/ML

## 2021-07-21 ENCOUNTER — HOSPITAL ENCOUNTER (EMERGENCY)
Facility: HOSPITAL | Age: 61
Discharge: HOME | End: 2021-07-21
Attending: EMERGENCY MEDICINE | Admitting: EMERGENCY MEDICINE
Payer: OTHER MISCELLANEOUS

## 2021-07-21 ENCOUNTER — HOSPITAL ENCOUNTER (EMERGENCY)
Dept: RADIOLOGY | Facility: HOSPITAL | Age: 61
End: 2021-07-21
Attending: STUDENT IN AN ORGANIZED HEALTH CARE EDUCATION/TRAINING PROGRAM
Payer: OTHER MISCELLANEOUS

## 2021-07-21 VITALS
SYSTOLIC BLOOD PRESSURE: 132 MMHG | HEIGHT: 62 IN | RESPIRATION RATE: 18 BRPM | TEMPERATURE: 98.5 F | WEIGHT: 122 LBS | BODY MASS INDEX: 22.45 KG/M2 | HEART RATE: 65 BPM | DIASTOLIC BLOOD PRESSURE: 60 MMHG | OXYGEN SATURATION: 99 %

## 2021-07-21 DIAGNOSIS — S82.891A ANKLE FRACTURE, RIGHT, CLOSED, INITIAL ENCOUNTER: ICD-10-CM

## 2021-07-21 PROCEDURE — 73610 X-RAY EXAM OF ANKLE: CPT | Mod: RT

## 2021-07-21 PROCEDURE — 29515 APPLICATION SHORT LEG SPLINT: CPT

## 2021-07-21 PROCEDURE — 99284 EMERGENCY DEPT VISIT MOD MDM: CPT

## 2021-07-21 RX ORDER — ACETAMINOPHEN 325 MG/1
650 TABLET ORAL ONCE
Status: CANCELLED | OUTPATIENT
Start: 2021-07-21 | End: 2021-07-21

## 2021-07-21 ASSESSMENT — MIFFLIN-ST. JEOR: SCORE: 1081.64

## 2021-07-21 NOTE — ED TRIAGE NOTES
Pt fell down a couple of stairs at 1230 today and twisted her  Right ankle. Pt c/o of right ankle pain rated   6/10 and swelling.

## 2021-07-22 ENCOUNTER — TRANSFERRED RECORDS (OUTPATIENT)
Dept: HEALTH INFORMATION MANAGEMENT | Facility: CLINIC | Age: 61
End: 2021-07-22

## 2021-07-22 NOTE — ED PROVIDER NOTES
EMERGENCY DEPARTMENT ENCOUNTER      NAME: Yuliya Feliz  AGE: 61 year old female  YOB: 1960  MRN: [id number]  EVALUATION DATE & TIME: 7/21/2021  7:38 PM    PCP: [facility]    ED PROVIDER: Madhav Cardenas M.D.        IMPRESSION:  Right ankle fracture      MEDICAL DECISION MAKING:  Patient evaluated for isolated right lower extremity ankle fracture.  She denies injury to her head neck chest pelvis or additional extremities.    On exam her blood pressure slightly elevated.  There is no evidence of injury to the head neck upper extremities torso or abdomen.  Right ankle demonstrates swelling over the lateral malleolus.  The joint is stable.  There is tenderness as well.  Achilles is intact.  There is no foot tenderness.    The foot was iced and she was given pain medication    Survey shows nondisplaced fracture of the distal fibula.    She was fitted with a immobilization boot given crutches.    Results were discussed with the patient    She is stable for discharge home and outpatient follow-up with orthopedics      =================================================================  CHIEF COMPLAINT:  Chief Complaint   Patient presents with     Ankle Pain         HPI  Yuliya Feliz is a 61 year old female with a history of complex partial seizure who presents to the ED by walk in for evaluation of ankle pain.    The patient reports that she fell down the stairs today around 12:30 and twisted her right ankle. The patient reports persistent pain currently rated 6/10. She also reports swelling in her right ankle. The patient denies any other pain or any other symptoms at this time.      I, Harris Alan, am serving as a scribe to document services personally performed by Dr. Madhav Cardenas MD, based on my observation and the provider's statements to me. I, Dr. Madhav Cardenas MD attest that Harris Alan is acting in a scribe capacity, has observed my performance of the services and has documented them in accordance  with my direction.      REVIEW OF SYSTEMS   Constitutional: Denies fever, chills, unintentional weight loss or fatigue   Eyes: Denies visual changes or discharge    HENT: Denies sore throat, ear pain or neck pain  Respiratory: Denies cough or shortness of breath    Cardiovascular: Denies chest pain, palpitations or leg swelling  GI: Denies abdominal pain, nausea, vomiting, or dark, bloody stools.    : Denies hematuria, dysuria, or flank pain  Musculoskeletal: Denies any new back pain. Positive for right ankle pain.  Skin: Denies rash or wound  Neurologic: Denies current headache, new weakness, focal weakness, or sensory changes    Lymphatic: Denies swollen glands    Psychiatric: Denies depression, suicidal ideation or homicidal ideation.    Remainder of systems reviewed, unless noted in HPI all others negative.      PAST MEDICAL HISTORY:  Past Medical History:   Diagnosis Date     Seizures (H)        PAST SURGICAL HISTORY:  Past Surgical History:   Procedure Laterality Date     GYN SURGERY           CURRENT MEDICATIONS:    Black Cohosh 160 MG CAPS  CALCIUM PO  Cholecalciferol (VITAMIN D3 PO)  lamoTRIgine (LAMICTAL) 200 MG tablet  magnesium oxide (MAG-OX) 100 mg TABS quarter-tab  melatonin 1 MG CAPS  simvastatin (ZOCOR) 20 MG tablet  TURMERIC PO  VITAMIN B COMPLEX-C PO        ALLERGIES:  Allergies   Allergen Reactions     Cephalexin      Latex      Naproxen        FAMILY HISTORY:  Family History   Problem Relation Age of Onset     Cancer Mother      Alcoholism Father      Seizure Disorder Father      Cancer Brother      Parkinsonism Paternal Aunt        SOCIAL HISTORY:   Social History     Socioeconomic History     Marital status:      Spouse name: Not on file     Number of children: Not on file     Years of education: Not on file     Highest education level: Not on file   Occupational History     Not on file   Tobacco Use     Smoking status: Never Smoker     Smokeless tobacco: Never Used   Substance and  "Sexual Activity     Alcohol use: Not Currently     Drug use: Never     Sexual activity: Not on file   Other Topics Concern     Parent/sibling w/ CABG, MI or angioplasty before 65F 55M? Not Asked   Social History Narrative     Not on file     Social Determinants of Health     Financial Resource Strain:      Difficulty of Paying Living Expenses:    Food Insecurity:      Worried About Running Out of Food in the Last Year:      Ran Out of Food in the Last Year:    Transportation Needs:      Lack of Transportation (Medical):      Lack of Transportation (Non-Medical):    Physical Activity:      Days of Exercise per Week:      Minutes of Exercise per Session:    Stress:      Feeling of Stress :    Social Connections:      Frequency of Communication with Friends and Family:      Frequency of Social Gatherings with Friends and Family:      Attends Gnosticist Services:      Active Member of Clubs or Organizations:      Attends Club or Organization Meetings:      Marital Status:    Intimate Partner Violence:      Fear of Current or Ex-Partner:      Emotionally Abused:      Physically Abused:      Sexually Abused:        PHYSICAL EXAM:    BP (!) 140/63   Pulse 60   Temp 98.5  F (36.9  C) (Tympanic)   Resp 12   Ht 1.575 m (5' 2\")   Wt 55.3 kg (122 lb)   SpO2 98%   BMI 22.31 kg/m      Constitutional: Awake, alert, in no acute distress  Head: Normocephalic, atraumatic.  Respiratory: Respirations even, unlabored. Lungs clear to ascultation bilaterally, in no acute respiratory distress.  Cardiovascular: Regular rate and rhythm.+2 radial pulses, equal bilaterally. No peripheral edema.   GI: Abdomen soft, non-tender to palpation in all 4 quadrants. No guarding or rebound. Bowel sounds intact on all 4 quadrants.   : No CVA tenderness.    Musculoskeletal: Right ankle is swollen laterally with soft tissue swelling and tenderness.  Achilles is intact.  There is no proximal fibular tenderness or foot tenderness.  Remainder of her " musculoskeletal exam does not show any evidence of injury.  Integument: Warm, dry. No rash. No bruising or petechiae.  Lymphatic: No cervical lymphadenopathy  Neurologic: Alert & oriented x 3. Normal speech. Grossly normal motor and sensory function. No focal deficits noted.  Psychiatric: Normal mood and affect. Normal judgement.    ED COURSE:  8:41 PM I met with the patient, obtained history, performed an initial exam, and discussed options and plan for diagnostics and treatment here in the ED.          Results for orders placed or performed during the hospital encounter of 07/21/21   Ankle XR, G/E 3 views, right    Impression    IMPRESSION: Acute nondisplaced fracture of the distal fibula below the level of the ankle mortise. No additional fractures are evident. The ankle mortise is intact. The talar dome is unremarkable. Diffuse soft tissue swelling.       RADIOLOGY:  Reviewed all pertinent imaging. Please see official radiology report.  Ankle XR, G/E 3 views, right   Final Result   IMPRESSION: Acute nondisplaced fracture of the distal fibula below the level of the ankle mortise. No additional fractures are evident. The ankle mortise is intact. The talar dome is unremarkable. Diffuse soft tissue swelling.           PROCEDURES:   CAM Walker fitted by nurses      MEDICATIONS GIVEN IN THE EMERGENCY:  Medications - No data to display        NEW PRESCRIPTIONS STARTED AT TODAY'S ER VISIT:  New Prescriptions    No medications on file          FINAL DIAGNOSIS:    ICD-10-CM    1. Ankle fracture, right, closed, initial encounter  S82.891A             At the conclusion of the encounter I discussed the results of all of the tests and the disposition. The questions were answered. The patient or family acknowledged understanding and was agreeable with the care plan.       I, Harris Alan, am serving as a scribe to document services personally performed by Dr. Madhav Cardenas based on my observation and the provider's statements to  me. I, Madhav Cardenas MD attest that Harris Alan is acting in a scribe capacity, has observed my performance of the services and has documented them in accordance with my direction.    Madhav Cardenas M.D.  Emergency Medicine  [facility] EMERGENCY DEPARTMENT  [address] [place] [id number]  [phone]  Dept: [phone]  7/21/2021       Madhav Cardenas MD  07/21/21 2112

## 2021-07-22 NOTE — ED NOTES
Pt reports she tripped going down some stairs at work and hurt right ankle. Denies striking head. Pain to right ankle with swelling. Pillow provided to elevate and ice pack given.

## 2021-07-22 NOTE — DISCHARGE INSTRUCTIONS
Your right ankle is fractured.  You should elevate and take Tylenol for pain.  The ankle has been immobilized with a walking boot.  Use crutches and do not weight-bear until seen by the specialist.  You may need additional mobilization

## 2021-08-07 ENCOUNTER — HOSPITAL ENCOUNTER (OUTPATIENT)
Dept: MRI IMAGING | Facility: HOSPITAL | Age: 61
Discharge: HOME | End: 2021-08-07
Attending: PSYCHIATRY & NEUROLOGY | Admitting: PSYCHIATRY & NEUROLOGY
Payer: COMMERCIAL

## 2021-08-07 DIAGNOSIS — M47.812 CERVICAL SPONDYLOSIS WITHOUT MYELOPATHY: ICD-10-CM

## 2021-08-07 PROCEDURE — 72141 MRI NECK SPINE W/O DYE: CPT

## 2021-08-09 ENCOUNTER — TELEPHONE (OUTPATIENT)
Dept: NEUROLOGY | Facility: CLINIC | Age: 61
End: 2021-08-09

## 2021-08-09 DIAGNOSIS — M48.02 CERVICAL STENOSIS OF SPINAL CANAL: ICD-10-CM

## 2021-08-09 DIAGNOSIS — M54.12 CERVICAL RADICULOPATHY AT C6: Primary | ICD-10-CM

## 2021-08-09 NOTE — TELEPHONE ENCOUNTER
MRI Cervical spine shows severe C6-C7 stenosis in addition to pinched nerve at left C5-C6. Recommendations:      Refer to Neurosurgery, Dr Alaniz    Schedule EMG upper ext    Orders entered in chart          IMPRESSION:  1.  At C4-C5, there is severe left neural foraminal stenosis.  2.  At C5-C6, a left paracentral protrusion compresses the left hemicord. There is severe left neural foraminal stenosis.  3.  At C6-C7, there is severe spinal canal stenosis with flattening and deformity of the cord. There is moderate right neural foraminal stenosis.  4.  No convincing cord signal abnormalities.  5.  Additional findings as above.

## 2021-08-10 NOTE — TELEPHONE ENCOUNTER
Patient informed and verbalized understanding  order faxed to [facility] neurosurgery  Glo Foreman CMA on 8/10/2021 at 2:58 PM

## 2021-08-17 DIAGNOSIS — M54.12 CERVICAL RADICULOPATHY: Primary | ICD-10-CM

## 2021-08-24 ENCOUNTER — HOSPITAL ENCOUNTER (OUTPATIENT)
Dept: RADIOLOGY | Facility: HOSPITAL | Age: 61
Discharge: HOME | End: 2021-08-24
Attending: NEUROLOGICAL SURGERY | Admitting: NEUROLOGICAL SURGERY
Payer: COMMERCIAL

## 2021-08-24 ENCOUNTER — OFFICE VISIT (OUTPATIENT)
Dept: NEUROSURGERY | Facility: CLINIC | Age: 61
End: 2021-08-24
Attending: PSYCHIATRY & NEUROLOGY
Payer: COMMERCIAL

## 2021-08-24 VITALS
HEART RATE: 62 BPM | DIASTOLIC BLOOD PRESSURE: 68 MMHG | HEIGHT: 62 IN | SYSTOLIC BLOOD PRESSURE: 145 MMHG | WEIGHT: 122 LBS | BODY MASS INDEX: 22.45 KG/M2 | OXYGEN SATURATION: 98 %

## 2021-08-24 DIAGNOSIS — M47.12 CERVICAL SPONDYLOSIS WITH MYELOPATHY: Primary | ICD-10-CM

## 2021-08-24 DIAGNOSIS — M54.12 CERVICAL RADICULOPATHY: ICD-10-CM

## 2021-08-24 DIAGNOSIS — M48.02 CERVICAL STENOSIS OF SPINAL CANAL: ICD-10-CM

## 2021-08-24 PROCEDURE — 99205 OFFICE O/P NEW HI 60 MIN: CPT | Performed by: NEUROLOGICAL SURGERY

## 2021-08-24 PROCEDURE — 72050 X-RAY EXAM NECK SPINE 4/5VWS: CPT

## 2021-08-24 ASSESSMENT — MIFFLIN-ST. JEOR: SCORE: 1081.64

## 2021-08-24 NOTE — NURSING NOTE
Neurosurgery consultation was requested by: Dr. Jim Hahn.   Pt has been having numbness/tingling sensation on and off on left side of face. She also c/o spasms in her upper and lower extremities on and off. She denies any neck pain. Occasional balance issues.   Sharda,CMA

## 2021-08-24 NOTE — PATIENT INSTRUCTIONS
DR. FOSTER OFFERED YOU A CERVICAL LAMINOPLASTY C4-6 OPEN LEFT.     CALL IF YOU WOULD LIKE TO PROCEED.    Problem: Patient Care Overview (Adult)  Goal: Plan of Care Review  Outcome: Ongoing (interventions implemented as appropriate)    01/12/17 7080   Coping/Psychosocial Response Interventions   Plan Of Care Reviewed With patient   Patient Care Overview   Progress no change   Outcome Evaluation   Outcome Summary/Follow up Plan No c/o CP today. S/P heart cath via right radial , site C/D/I and soft. Cath showed multi vess. disease. Plan is to be 1st case CABG in am with Dr. Farrell. VSS.        Goal: Adult Individualization and Mutuality  Outcome: Ongoing (interventions implemented as appropriate)  Goal: Discharge Needs Assessment  Outcome: Ongoing (interventions implemented as appropriate)    Problem: Pain, Acute (Adult)  Goal: Identify Related Risk Factors and Signs and Symptoms  Outcome: Ongoing (interventions implemented as appropriate)  Goal: Acceptable Pain Control/Comfort Level  Outcome: Ongoing (interventions implemented as appropriate)    Problem: Activity Intolerance (Adult)  Goal: Identify Related Risk Factors and Signs and Symptoms  Outcome: Ongoing (interventions implemented as appropriate)  Goal: Activity Tolerance  Outcome: Ongoing (interventions implemented as appropriate)  Goal: Effective Energy Conservation Techniques  Outcome: Ongoing (interventions implemented as appropriate)    Problem: Cardiac Catheterization with/without PCI (Adult)  Goal: Signs and Symptoms of Listed Potential Problems Will be Absent or Manageable (Cardiac Catheterization with/without PCI)  Outcome: Ongoing (interventions implemented as appropriate)

## 2021-08-24 NOTE — PROGRESS NOTES
"NEUROSURGERY CONSULTATION NOTE    8/24/2021       CHIEF COMPLAINT: Cervical myelopathy, facial tingling    HPI:    Yuliya Feliz is a 61 year old female who is sent to us in consultation by Dr. Jim Hahn for evaluation of cervical myelopathy.       Onset: 1 year   Character: Getting spasms/mild jerky movements in her arms and legs intermittently    Numbness/tingling: Intermittent involving the left side of her face- happening once every couple of weeks- can last a couple hours, other times just 5 min. Localized to the mid left cheek- denies symptoms around the jaw line, forehead, nose  Weakness: Denies, but she is currently in a boot for her recent right ankle fracture- she has nonweight bearing status in the right leg    Chronic balance issues- 2 years. Has had two falls within the last year.   Notes some difficulty opening jars and bottles- blamed on \"old age\"- feels her right hand/wrist symptoms have developed since she was bit by a martinez spider which was 3 years  Denies difficulty buttoning buttons or zipping zippers    Past Medical History:   Diagnosis Date     Seizures (H)      Past Surgical History:   Procedure Laterality Date     GYN SURGERY         REVIEW OF SYSTEMS:  Pt denies changes in bowel or bladder habits. No incontinence. A full 14 point review of systems was otherwise completed and is negative aside from that mentioned above in the HPI    MEDICATIONS:  Current Outpatient Medications   Medication Sig Dispense Refill     Black Cohosh 160 MG CAPS        CALCIUM PO        Cholecalciferol (VITAMIN D3 PO)   0 Refill(s), Type: Maintenance       lamoTRIgine (LAMICTAL) 200 MG tablet Take 1 tablet (200 mg) by mouth 2 times daily 180 tablet 3     magnesium oxide (MAG-OX) 100 mg TABS quarter-tab        melatonin 1 MG CAPS        simvastatin (ZOCOR) 20 MG tablet        TURMERIC PO        VITAMIN B COMPLEX-C PO            ALLERGIES/SENSITIVITIES:     Allergies   Allergen Reactions     Cephalexin      Latex  " "    Naproxen        PERTINENT SOCIAL HISTORY:   Social History     Socioeconomic History     Marital status:      Spouse name: None     Number of children: None     Years of education: None     Highest education level: None   Occupational History     None   Tobacco Use     Smoking status: Never Smoker     Smokeless tobacco: Never Used   Substance and Sexual Activity     Alcohol use: Not Currently     Drug use: Never     Sexual activity: None   Other Topics Concern     Parent/sibling w/ CABG, MI or angioplasty before 65F 55M? Not Asked   Social History Narrative     None     Social Determinants of Health     Financial Resource Strain:      Difficulty of Paying Living Expenses:    Food Insecurity:      Worried About Running Out of Food in the Last Year:      Ran Out of Food in the Last Year:    Transportation Needs:      Lack of Transportation (Medical):      Lack of Transportation (Non-Medical):    Physical Activity:      Days of Exercise per Week:      Minutes of Exercise per Session:    Stress:      Feeling of Stress :    Social Connections:      Frequency of Communication with Friends and Family:      Frequency of Social Gatherings with Friends and Family:      Attends Scientology Services:      Active Member of Clubs or Organizations:      Attends Club or Organization Meetings:      Marital Status:    Intimate Partner Violence:      Fear of Current or Ex-Partner:      Emotionally Abused:      Physically Abused:      Sexually Abused:          FAMILY HISTORY:  Family History   Problem Relation Age of Onset     Cancer Mother      Alcoholism Father      Seizure Disorder Father      Cancer Brother      Parkinsonism Paternal Aunt         PHYSICAL EXAM:     Constitution: BP (!) 145/68   Pulse 62   Ht 5' 2\" (1.575 m)   Wt 122 lb (55.3 kg)   SpO2 98%   BMI 22.31 kg/m  .   Awake, alert and in NAD  Eyes: Conjugate gaze. Conjunctiva benign without icterus or injection  Heart: RRR  Lungs: Non-labored respiration " without accessory muscle use  Skin: No obvious rash or lesion  Psych: Appropriate mood and affect, alert and oriented x 3  Mental Status:  Speech is fluent.  Recent and remote memory are intact.  Attention span and concentration are normal.     Motor: Normal bulk and tone all muscle groups of upper and lower extremities.     Right Left  Right Left   Deltoid 5 5 Hip flexion 5 5   Biceps 5 5 Hip extension 5 5   Triceps 5 5 Knee flexion 5 5   Wrist ex 5 5 Knee ex 5 5   Wrist flex 5 5 Dorsiflex Deferred * 5   Finger ex 5 5 Plantar flex Deferred * 5   Hand intrinsic 5 5 EHL Deferred * 5    Full Full       * in boot immobilizer for recent ankle fracture    Sensory: Sensation intact bilaterally to light touch and temperature throughout. Sensation is intact to pinprick in the bilateral LE. Vibratory sense is intact in the bl great toe.     Coordination:  Gait testing was deferred due to right boot immobilizer and nonweightbearing status. DELORES in the UE is WNL on the right- her dominant hand. She has mild dysdiadochokinesia in the left UE.     Reflexes; 2+ supinator, biceps, triceps. 2+ patellar and achilles. +bilateral dahl. 2 beats of clonus on the left. Toes are down-going on the left; right foot testing deferred in light of boot immobilizer    Musculoskeletal: Negative straight leg raise bilaterally. Negative AILIN testing. Negative Giorgio finger test    IMAGING: I personally reviewed all radiographic images    MRI cervical spine 8/7/2021: Patient has evidence of multilevel cervical degenerative disc disease, multilevel cervical spondylosis at C4-5, there is a broad-based paracentral disc osteophyte complex, uncovertebral joint hypertrophy slightly worse on the left than the right where there is moderate left, mild right foraminal stenosis, no significant central canal stenosis.  At C5-6, there is severe disc height loss with a small focal left eccentric paracentral disc herniation, uncovertebral joint hypertrophy  bilaterally resulting in severe bilateral foraminal stenosis, severe central canal stenosis with flattening and compression of the left-sided cord with associated deformity but no evidence of definitive cord signal change.  At C5-6-7 again there is a broad-based paracentral disc bulge and osteophyte complex most pronounced in the midline where there is ventral cord deformation and patient has severe right, moderate left foraminal stenosis.      Cervical spine flexion-extension x-rays 8/24/2021 patient has well-maintained cervical lordosis which measures approximately 11 degrees.  In extension she has approximately 1.5 mm of retrolisthesis of C4 and C5 which appears to reduce in forward flexion.  No evidence of dynamic instability.    CONSULTATION ASSESSMENT AND PLAN:    Yuliya Feliz is a 61 year old female who has evidence of multilevel cervical spondylosis with degenerative disc disease who has severe spinal stenosis and cord compression eccentric to the left at C5-6 and C6-7 who has symptoms of intermittent facial tingling and intermittent jerking movements of the extremities. She has signs of hyperreflexia in the bilateral arms and legs without other pathologic long tract signs.    I reviewed Yuliya' imaging with her and her  today in clinic. Part of my examination with limited in light of her non-weight bearing status in her right leg (recent fracture). I noted that it is possible that her left cheek numbness is being caused by compression of the trigeminal tract within the cervical cord, but it could have other origins. Additionally, I am not sure if surgical intervention would eliminate the possible myoclonic jerks that she is having in her arms and legs. She does have diffuse hyperreflexia with bilateral dahl but no other pathologic long tract signs. I offered her a C4-6 left open door cervical laminoplasty. We discussed the risks benefits and alternatives to surgery. We discussed the natural  history of cervical myelopathy and the risk of acute cord injury in the setting of a narrowed cervical canal. She is not inclined to undergo surgical intervention at this point in time, but understands it will likely be necessary. She will be in contact with our clinic in likely 6 months time to discuss surgical planning further.    I spent more than 60 minutes in this apt, examining the pt, reviewing the scans, reviewing notes from chart, discussing treatment options with risks and benefits and coordinating care. >50 % clinic time was spent in face to face counseling and coordinating care    Kamila Begum MD       Cc:   [facility]

## 2021-08-24 NOTE — LETTER
"    8/24/2021         RE: Yuilya Feliz  [address] [place] [address]        Dear Colleague,    Thank you for referring your patient, Yuliya Feliz, to the [facility] NEUROSURGERY CLINIC [facility]. Please see a copy of my visit note below.    NEUROSURGERY CONSULTATION NOTE    8/24/2021       CHIEF COMPLAINT: Cervical myelopathy, facial tingling    HPI:    Yuliya Feliz is a 61 year old female who is sent to us in consultation by Dr. Jim Hahn for evaluation of cervical myelopathy.       Onset: 1 year   Character: Getting spasms/mild jerky movements in her arms and legs intermittently    Numbness/tingling: Intermittent involving the left side of her face- happening once every couple of weeks- can last a couple hours, other times just 5 min. Localized to the mid left cheek- denies symptoms around the jaw line, forehead, nose  Weakness: Denies, but she is currently in a boot for her recent right ankle fracture- she has nonweight bearing status in the right leg    Chronic balance issues- 2 years. Has had two falls within the last year.   Notes some difficulty opening jars and bottles- blamed on \"old age\"- feels her right hand/wrist symptoms have developed since she was bit by a martinez spider which was 3 years  Denies difficulty buttoning buttons or zipping zippers    Past Medical History:   Diagnosis Date     Seizures (H)      Past Surgical History:   Procedure Laterality Date     GYN SURGERY         REVIEW OF SYSTEMS:  Pt denies changes in bowel or bladder habits. No incontinence. A full 14 point review of systems was otherwise completed and is negative aside from that mentioned above in the HPI    MEDICATIONS:  Current Outpatient Medications   Medication Sig Dispense Refill     Black Cohosh 160 MG CAPS        CALCIUM PO        Cholecalciferol (VITAMIN D3 PO)   0 Refill(s), Type: Maintenance       lamoTRIgine (LAMICTAL) 200 MG tablet Take 1 tablet (200 mg) by mouth 2 times daily 180 tablet 3     " magnesium oxide (MAG-OX) 100 mg TABS quarter-tab        melatonin 1 MG CAPS        simvastatin (ZOCOR) 20 MG tablet        TURMERIC PO        VITAMIN B COMPLEX-C PO            ALLERGIES/SENSITIVITIES:     Allergies   Allergen Reactions     Cephalexin      Latex      Naproxen        PERTINENT SOCIAL HISTORY:   Social History     Socioeconomic History     Marital status:      Spouse name: None     Number of children: None     Years of education: None     Highest education level: None   Occupational History     None   Tobacco Use     Smoking status: Never Smoker     Smokeless tobacco: Never Used   Substance and Sexual Activity     Alcohol use: Not Currently     Drug use: Never     Sexual activity: None   Other Topics Concern     Parent/sibling w/ CABG, MI or angioplasty before 65F 55M? Not Asked   Social History Narrative     None     Social Determinants of Health     Financial Resource Strain:      Difficulty of Paying Living Expenses:    Food Insecurity:      Worried About Running Out of Food in the Last Year:      Ran Out of Food in the Last Year:    Transportation Needs:      Lack of Transportation (Medical):      Lack of Transportation (Non-Medical):    Physical Activity:      Days of Exercise per Week:      Minutes of Exercise per Session:    Stress:      Feeling of Stress :    Social Connections:      Frequency of Communication with Friends and Family:      Frequency of Social Gatherings with Friends and Family:      Attends Mu-ism Services:      Active Member of Clubs or Organizations:      Attends Club or Organization Meetings:      Marital Status:    Intimate Partner Violence:      Fear of Current or Ex-Partner:      Emotionally Abused:      Physically Abused:      Sexually Abused:          FAMILY HISTORY:  Family History   Problem Relation Age of Onset     Cancer Mother      Alcoholism Father      Seizure Disorder Father      Cancer Brother      Parkinsonism Paternal Aunt         PHYSICAL EXAM:  "    Constitution: BP (!) 145/68   Pulse 62   Ht 5' 2\" (1.575 m)   Wt 122 lb (55.3 kg)   SpO2 98%   BMI 22.31 kg/m  .   Awake, alert and in NAD  Eyes: Conjugate gaze. Conjunctiva benign without icterus or injection  Heart: RRR  Lungs: Non-labored respiration without accessory muscle use  Skin: No obvious rash or lesion  Psych: Appropriate mood and affect, alert and oriented x 3  Mental Status:  Speech is fluent.  Recent and remote memory are intact.  Attention span and concentration are normal.     Motor: Normal bulk and tone all muscle groups of upper and lower extremities.     Right Left  Right Left   Deltoid 5 5 Hip flexion 5 5   Biceps 5 5 Hip extension 5 5   Triceps 5 5 Knee flexion 5 5   Wrist ex 5 5 Knee ex 5 5   Wrist flex 5 5 Dorsiflex Deferred * 5   Finger ex 5 5 Plantar flex Deferred * 5   Hand intrinsic 5 5 EHL Deferred * 5    Full Full       * in boot immobilizer for recent ankle fracture    Sensory: Sensation intact bilaterally to light touch and temperature throughout. Sensation is intact to pinprick in the bilateral LE. Vibratory sense is intact in the bl great toe.     Coordination:  Gait testing was deferred due to right boot immobilizer and nonweightbearing status. DELORES in the UE is WNL on the right- her dominant hand. She has mild dysdiadochokinesia in the left UE.     Reflexes; 2+ supinator, biceps, triceps. 2+ patellar and achilles. +bilateral dahl. 2 beats of clonus on the left. Toes are down-going on the left; right foot testing deferred in light of boot immobilizer    Musculoskeletal: Negative straight leg raise bilaterally. Negative AILIN testing. Negative Giorgio finger test    IMAGING: I personally reviewed all radiographic images    MRI cervical spine 8/7/2021: Patient has evidence of multilevel cervical degenerative disc disease, multilevel cervical spondylosis at C4-5, there is a broad-based paracentral disc osteophyte complex, uncovertebral joint hypertrophy slightly worse on " the left than the right where there is moderate left, mild right foraminal stenosis, no significant central canal stenosis.  At C5-6, there is severe disc height loss with a small focal left eccentric paracentral disc herniation, uncovertebral joint hypertrophy bilaterally resulting in severe bilateral foraminal stenosis, severe central canal stenosis with flattening and compression of the left-sided cord with associated deformity but no evidence of definitive cord signal change.  At C5-6-7 again there is a broad-based paracentral disc bulge and osteophyte complex most pronounced in the midline where there is ventral cord deformation and patient has severe right, moderate left foraminal stenosis.      Cervical spine flexion-extension x-rays 8/24/2021 patient has well-maintained cervical lordosis which measures approximately 11 degrees.  In extension she has approximately 1.5 mm of retrolisthesis of C4 and C5 which appears to reduce in forward flexion.  No evidence of dynamic instability.    CONSULTATION ASSESSMENT AND PLAN:    Yuliya Feliz is a 61 year old female who has evidence of multilevel cervical spondylosis with degenerative disc disease who has severe spinal stenosis and cord compression eccentric to the left at C5-6 and C6-7 who has symptoms of intermittent facial tingling and intermittent jerking movements of the extremities. She has signs of hyperreflexia in the bilateral arms and legs without other pathologic long tract signs.    I reviewed Yuliya' imaging with her and her  today in clinic. Part of my examination with limited in light of her non-weight bearing status in her right leg (recent fracture). I noted that it is possible that her left cheek numbness is being caused by compression of the trigeminal tract within the cervical cord, but it could have other origins. Additionally, I am not sure if surgical intervention would eliminate the possible myoclonic jerks that she is having in her arms  and legs. She does have diffuse hyperreflexia with bilateral dahl but no other pathologic long tract signs. I offered her a C4-6 left open door cervical laminoplasty. We discussed the risks benefits and alternatives to surgery. We discussed the natural history of cervical myelopathy and the risk of acute cord injury in the setting of a narrowed cervical canal. She is not inclined to undergo surgical intervention at this point in time, but understands it will likely be necessary. She will be in contact with our clinic in likely 6 months time to discuss surgical planning further.    I spent more than 60 minutes in this apt, examining the pt, reviewing the scans, reviewing notes from chart, discussing treatment options with risks and benefits and coordinating care. >50 % clinic time was spent in face to face counseling and coordinating care    Kamila Begum MD       Cc:   [facility]          Again, thank you for allowing me to participate in the care of your patient.        Sincerely,        Kamila Begum MD

## 2021-09-17 ENCOUNTER — TELEPHONE (OUTPATIENT)
Dept: NEUROLOGY | Facility: CLINIC | Age: 61
End: 2021-09-17

## 2021-09-17 NOTE — TELEPHONE ENCOUNTER
Pt called requesting a call back. Pt is confused. Why does she need a EMG when she had the MRI? Pt will like to cancel EMG if it's not needed. Please call pt back [phone].

## 2021-09-20 NOTE — TELEPHONE ENCOUNTER
Patient informed why she needs EMG- she states she will call us back to schedule as she has a broken foot right now  Glo Foreman CMA on 9/20/2021 at 3:43 PM

## 2022-07-14 DIAGNOSIS — G40.209 PARTIAL SYMPTOMATIC EPILEPSY WITH COMPLEX PARTIAL SEIZURES, NOT INTRACTABLE, WITHOUT STATUS EPILEPTICUS (H): ICD-10-CM

## 2022-07-14 RX ORDER — LAMOTRIGINE 200 MG/1
200 TABLET ORAL 2 TIMES DAILY
Qty: 180 TABLET | Refills: 1 | Status: SHIPPED | OUTPATIENT
Start: 2022-07-14 | End: 2022-11-11

## 2022-07-14 NOTE — TELEPHONE ENCOUNTER
Refill request for Lamictal. Pt last seen 7/12/21 and has follow up scheduled for 11/11/22. Will send in refills.     Dary Womack RN on 7/14/2022 at 9:30 AM

## 2022-07-15 ENCOUNTER — LAB REQUISITION (OUTPATIENT)
Dept: LAB | Facility: CLINIC | Age: 62
End: 2022-07-15

## 2022-07-15 DIAGNOSIS — Z01.419 ENCOUNTER FOR GYNECOLOGICAL EXAMINATION (GENERAL) (ROUTINE) WITHOUT ABNORMAL FINDINGS: ICD-10-CM

## 2022-07-15 DIAGNOSIS — E78.2 MIXED HYPERLIPIDEMIA: ICD-10-CM

## 2022-07-15 LAB
ALBUMIN SERPL BCG-MCNC: 4.5 G/DL (ref 3.5–5.2)
ALP SERPL-CCNC: 54 U/L (ref 35–104)
ALT SERPL W P-5'-P-CCNC: 17 U/L (ref 10–35)
ANION GAP SERPL CALCULATED.3IONS-SCNC: 10 MMOL/L (ref 7–15)
AST SERPL W P-5'-P-CCNC: 19 U/L (ref 10–35)
BILIRUB SERPL-MCNC: 0.3 MG/DL
BUN SERPL-MCNC: 14 MG/DL (ref 8–23)
CALCIUM SERPL-MCNC: 9.7 MG/DL (ref 8.8–10.2)
CHLORIDE SERPL-SCNC: 101 MMOL/L (ref 98–107)
CHOLEST SERPL-MCNC: 228 MG/DL
CREAT SERPL-MCNC: 0.72 MG/DL (ref 0.51–0.95)
DEPRECATED HCO3 PLAS-SCNC: 28 MMOL/L (ref 22–29)
GFR SERPL CREATININE-BSD FRML MDRD: >90 ML/MIN/1.73M2
GLUCOSE SERPL-MCNC: 87 MG/DL (ref 70–99)
HDLC SERPL-MCNC: 91 MG/DL
LDLC SERPL CALC-MCNC: 103 MG/DL
NONHDLC SERPL-MCNC: 137 MG/DL
POTASSIUM SERPL-SCNC: 4.1 MMOL/L (ref 3.4–5.3)
PROT SERPL-MCNC: 6.4 G/DL (ref 6.4–8.3)
SODIUM SERPL-SCNC: 139 MMOL/L (ref 136–145)
TRIGL SERPL-MCNC: 169 MG/DL

## 2022-07-15 PROCEDURE — 87624 HPV HI-RISK TYP POOLED RSLT: CPT | Performed by: PHYSICIAN ASSISTANT

## 2022-07-15 PROCEDURE — 80061 LIPID PANEL: CPT | Performed by: PHYSICIAN ASSISTANT

## 2022-07-15 PROCEDURE — G0145 SCR C/V CYTO,THINLAYER,RESCR: HCPCS | Performed by: PHYSICIAN ASSISTANT

## 2022-07-15 PROCEDURE — 80053 COMPREHEN METABOLIC PANEL: CPT | Performed by: PHYSICIAN ASSISTANT

## 2022-07-20 LAB
BKR LAB AP GYN ADEQUACY: NORMAL
BKR LAB AP GYN INTERPRETATION: NORMAL
BKR LAB AP HPV REFLEX: NORMAL
BKR LAB AP LMP: NORMAL
BKR LAB AP PREVIOUS ABNORMAL: NORMAL
PATH REPORT.COMMENTS IMP SPEC: NORMAL
PATH REPORT.COMMENTS IMP SPEC: NORMAL
PATH REPORT.RELEVANT HX SPEC: NORMAL

## 2022-07-21 LAB
HUMAN PAPILLOMA VIRUS 16 DNA: NEGATIVE
HUMAN PAPILLOMA VIRUS 18 DNA: NEGATIVE
HUMAN PAPILLOMA VIRUS FINAL DIAGNOSIS: NORMAL
HUMAN PAPILLOMA VIRUS OTHER HR: NEGATIVE

## 2022-10-12 NOTE — NURSING NOTE
Chief Complaint   Patient presents with     Follow Up     Seizures     Viki Grier on 7/7/2020 at 10:36 AM     Awake/Alert

## 2022-11-11 ENCOUNTER — LAB (OUTPATIENT)
Dept: LAB | Facility: HOSPITAL | Age: 62
End: 2022-11-11
Payer: COMMERCIAL

## 2022-11-11 ENCOUNTER — OFFICE VISIT (OUTPATIENT)
Dept: NEUROLOGY | Facility: CLINIC | Age: 62
End: 2022-11-11
Payer: COMMERCIAL

## 2022-11-11 VITALS
HEIGHT: 62 IN | DIASTOLIC BLOOD PRESSURE: 69 MMHG | WEIGHT: 122 LBS | SYSTOLIC BLOOD PRESSURE: 129 MMHG | BODY MASS INDEX: 22.45 KG/M2 | HEART RATE: 51 BPM

## 2022-11-11 DIAGNOSIS — M48.02 CERVICAL STENOSIS OF SPINAL CANAL: Primary | ICD-10-CM

## 2022-11-11 DIAGNOSIS — G40.209 PARTIAL SYMPTOMATIC EPILEPSY WITH COMPLEX PARTIAL SEIZURES, NOT INTRACTABLE, WITHOUT STATUS EPILEPTICUS (H): ICD-10-CM

## 2022-11-11 LAB
ALBUMIN SERPL BCG-MCNC: 4.6 G/DL (ref 3.5–5.2)
ALP SERPL-CCNC: 56 U/L (ref 35–104)
ALT SERPL W P-5'-P-CCNC: 16 U/L (ref 10–35)
AST SERPL W P-5'-P-CCNC: 18 U/L (ref 10–35)
BILIRUB DIRECT SERPL-MCNC: <0.2 MG/DL (ref 0–0.3)
BILIRUB SERPL-MCNC: 0.2 MG/DL
PROT SERPL-MCNC: 6.9 G/DL (ref 6.4–8.3)

## 2022-11-11 PROCEDURE — 99215 OFFICE O/P EST HI 40 MIN: CPT | Performed by: PSYCHIATRY & NEUROLOGY

## 2022-11-11 PROCEDURE — 80076 HEPATIC FUNCTION PANEL: CPT

## 2022-11-11 PROCEDURE — 80175 DRUG SCREEN QUAN LAMOTRIGINE: CPT

## 2022-11-11 PROCEDURE — 36415 COLL VENOUS BLD VENIPUNCTURE: CPT

## 2022-11-11 RX ORDER — ASPIRIN 81 MG/1
81 TABLET ORAL DAILY
COMMUNITY

## 2022-11-11 RX ORDER — LAMOTRIGINE 200 MG/1
200 TABLET ORAL 2 TIMES DAILY
Qty: 180 TABLET | Refills: 3 | Status: SHIPPED | OUTPATIENT
Start: 2022-11-11 | End: 2023-12-12

## 2022-11-11 NOTE — LETTER
2022         RE: Yuliya Feliz  [address] [place] [address]        Dear Colleague,    Thank you for referring your patient, Yuliya Feliz, to the [facility] NEUROLOGY CLINIC [place]. Please see a copy of my visit note below.    NEUROLOGY FOLLOW UP VISIT  NOTE       [facility] NEUROLOGY [place]  [address] [place] [address]  Tel: [phone]  Fax: [phone]  [email or web address]     Yuliya Feliz,  1960, MRN [id number]  PCP: [facility]  Date: 2022      ASSESSMENT & PLAN     Visit Diagnosis  1. Partial symptomatic epilepsy with complex partial seizures, not intractable, without status epilepticus (H)  2. Severe C6-C7 stenosis     Complex partial seizures  62-year-old female with history of HLD, GERD, complex partial seizure, severe C6/C7 stenosis who returns for yearly follow-up.  Her seizures are well controlled and I have recommended:    1.  Continue Lamictal 200 mg twice daily prescriptions were filled for the next year  2.  Check lamotrigine level and hepatic profile  3.  Follow-up will be in 1 year    Severe C6-C7 stenosis  Patient reported generalized muscle twitches along with incoordination and had significant hyperreflexia.  MRI cervical spine showed severe C6/C7 stenosis.  She saw neurosurgery and although they recommended C4-C6 left open-door cervical laminoplasty, she was reluctant.  I have recommended:    1.  Repeat MRI cervical spine  2.  If any cord signal abnormality is seen I would recommend seeing neurosurgery again.  If MRI is unchanged she is agreeable to the idea of seeing neurosurgery in summertime to get cervical laminoplasty done.  3.  Follow-up will be in 1 year    Thank you again for this referral, please feel free to contact me if you have any questions.    Jim Hahn MD  [facility]  (Formerly, [facility]     HISTORY OF PRESENT ILLNESS     Patient is  a pleasant 62-year-old female with history of HLD, GERD, complex partial seizure, cervical spine stenosis who returns for yearly follow-up.  She was last seen on 7/12/2021 when she was continued on lamotrigine and blood level was checked that was therapeutic.  She had also complained of some neck pain and had significant hyperreflexia.  MRI cervical spine was done that showed severe C4-C5 neuroforaminal stenosis with left paracentral disc herniation compressing the left hemicord and at C6-C7 there was severe spinal canal stenosis with flattening..  She was referred to neurosurgery and also EMG was ordered that was not done.  Neurosurgery offered a C4-C6 left open-door cervical laminoplasty but patient was reluctant and opted to defer surgery.  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 Her symptoms started in 2010 when she had a nocturnal seizure and at that time had MRI and EEG that were unremarkable. She was started on Keppra for a short duration and afterwards was discontinued. She was doing fine until 2014 when she woke up, and around 8:40 a.m. she went into the bathroom, and afterwards her  heard her hitting the floor. He found her unconscious without significant tonic-clonic activity. She was unconscious for about 10 minutes and was confused. She was taken to the hospital and had a CT of the head that was unremarkable. EEG at that time showed dysrhythmia grade III left temporal and MRI of the head showed the left hippocampal body slightly smaller than the right and likely a normal variant. She was started on Lamictal with good control of seizures     PROBLEM LIST   Patient Active Problem List   Diagnosis Code     Complex Partial  "Seizure (Dysrhythmia III Lt. Temporal) G40.209     Gastroesophageal reflux disease K21.9     Hyperlipidemia E78.5     Severe C6-C7 stenosis M48.02         PAST MEDICAL & SURGICAL HISTORY     Past Medical History:   Patient  has a past medical history of Seizures (H).    Surgical History:  She  has a past surgical history that includes GYN surgery.     SOCIAL HISTORY     Reviewed, and she  reports that she has never smoked. She has never used smokeless tobacco. She reports that she does not currently use alcohol. She reports that she does not use drugs.     FAMILY HISTORY     Reviewed, and family history includes Alcoholism in her father; Cancer in her brother and mother; Parkinsonism in her paternal aunt; Seizure Disorder in her father.     ALLERGIES     Allergies   Allergen Reactions     Rivaroxaban Palpitations and Shortness Of Breath     Cephalexin      Latex      Naproxen          REVIEW OF SYSTEMS     A 12 point review of system was performed and was negative except as outlined in the history of present illness.     HOME MEDICATIONS     Current Outpatient Rx   Medication Sig Dispense Refill     aspirin 81 MG EC tablet Take 81 mg by mouth daily       Black Cohosh 160 MG CAPS        CALCIUM PO        Cholecalciferol (VITAMIN D3 PO)   0 Refill(s), Type: Maintenance       lamoTRIgine (LAMICTAL) 200 MG tablet Take 1 tablet (200 mg) by mouth 2 times daily 180 tablet 3     magnesium oxide (MAG-OX) 100 mg TABS quarter-tab        melatonin 1 MG CAPS        simvastatin (ZOCOR) 20 MG tablet        VITAMIN B COMPLEX-C PO            PHYSICAL EXAM     Vital signs  /69 (BP Location: Left arm, Patient Position: Sitting)   Pulse 51   Ht 1.575 m (5' 2\")   Wt 55.3 kg (122 lb)   BMI 22.31 kg/m      Weight:   122 lbs 0 oz    Patient is alert and oriented x4 in no acute distress. Vital signs were reviewed and are documented in electronic medical record. Neck was supple, no carotid bruits, thyromegaly, JVD, or " lymphadenopathy was noted.   NEUROLOGY EXAM:    Patient s speech was normal with no aphasia or dysarthria. Mentation, and affect were also normal.     Funduscopic exam was normal, with normal cup to disc ratio. Cranial nerves II -XII were intact.     Patient had normal mass, tone and motor strength was 5/5 in all extremities without pronator drift.     Sensation was intact to light touch, pinprick, and vibratory sensation.     Reflexes were 3+ symmetrical with downgoing toes.     No dysmetria noted on FNF or HKS. Romberg was negative.    Gait testing was normal. Able to walk on toes/heels. Tandem walk normal.     PERTINENT DIAGNOSTIC STUDIES     Following studies were reviewed:     MRI CERVICAL SPINE 8/7/2021  1.  At C4-C5, there is severe left neural foraminal stenosis.  2.  At C5-C6, a left paracentral protrusion compresses the left hemicord. There is severe left neural foraminal stenosis.  3.  At C6-C7, there is severe spinal canal stenosis with flattening and deformity of the cord. There is moderate right neural foraminal stenosis.  4.  No convincing cord signal abnormalities.  5.  Additional findings as above    (05/10/2017 09:02 CDT MRI Report)  1. No recent infarct, intracranial mass or abnormal intracranial enhancement.  2. No structural or migrational abnormality identified.  3. Within the limits of motion on the study, the mesial temporal structures are within normal limits bilaterally.  4. Stable mild number of small foci of nonspecific nonenhancing T2 signal changes in the supratentorial white matter which is not atypical in a patient of this age. These may reflect foci of nonspecific gliosis or chronic myelin loss or mild chronic small vessel ischemic changes.    EEG 5/9/17: This is abnormal sleep deprived EEG due to paroxysmal slowing of the background in theta range that is maximally expressed in the left hemisphere and suggests nonspecific cerebral dysfunction with predominantly left hemispheric  pathology. No sharp activity or rhythmic discharges were seen.     PERTINENT LABS  Following labs were reviewed:  No visits with results within 3 Month(s) from this visit.   Latest known visit with results is:   Lab Requisition on 07/15/2022   Component Date Value     Interpretation 07/15/2022 Negative for Intraepithelial Lesion or Malignancy (NILM)      Comment 07/15/2022                      Value:This result contains rich text formatting which cannot be displayed here.     Specimen Adequacy 07/15/2022 Satisfactory for evaluation, endocerv/transformation zone component absent, atrophy      Clinical Information 07/15/2022                      Value:This result contains rich text formatting which cannot be displayed here.     LMP/Menopause Date 07/15/2022                      Value:This result contains rich text formatting which cannot be displayed here.     Reflex Testing 07/15/2022 Yes regardless of result      Previous Abnormal? 07/15/2022                      Value:This result contains rich text formatting which cannot be displayed here.     Performing Labs 07/15/2022                      Value:This result contains rich text formatting which cannot be displayed here.     Cholesterol 07/15/2022 228 (H)      Triglycerides 07/15/2022 169 (H)      Direct Measure HDL 07/15/2022 91      LDL Cholesterol Calculat* 07/15/2022 103 (H)      Non HDL Cholesterol 07/15/2022 137 (H)      Sodium 07/15/2022 139      Potassium 07/15/2022 4.1      Creatinine 07/15/2022 0.72      Urea Nitrogen 07/15/2022 14.0      Chloride 07/15/2022 101      Carbon Dioxide (CO2) 07/15/2022 28      Anion Gap 07/15/2022 10      Glucose 07/15/2022 87      Calcium 07/15/2022 9.7      Protein Total 07/15/2022 6.4      Albumin 07/15/2022 4.5      Bilirubin Total 07/15/2022 0.3      Alkaline Phosphatase 07/15/2022 54      AST 07/15/2022 19      ALT 07/15/2022 17      GFR Estimate 07/15/2022 >90      Other HR HPV 07/15/2022 Negative      HPV16 DNA  07/15/2022 Negative      HPV18 DNA 07/15/2022 Negative      FINAL DIAGNOSIS 07/15/2022                      Value:This result contains rich text formatting which cannot be displayed here.         Total time spent for face to face visit, reviewing labs/imaging studies, counseling and coordination of care was: 45 Minutes spent on the date of the encounter doing chart review, review of outside records, review of test results, interpretation of tests, patient visit and documentation       This note was dictated using voice recognition software.  Any grammatical or context distortions are unintentional and inherent to the software.    Orders Placed This Encounter   Procedures     MR Cervical Spine w/o Contrast     Lamotrigine Level     Hepatic function panel      New Prescriptions    No medications on file     Modified Medications    Modified Medication Previous Medication    LAMOTRIGINE (LAMICTAL) 200 MG TABLET lamoTRIgine (LAMICTAL) 200 MG tablet       Take 1 tablet (200 mg) by mouth 2 times daily    Take 1 tablet (200 mg) by mouth 2 times daily                     Again, thank you for allowing me to participate in the care of your patient.        Sincerely,        Jim Hahn MD

## 2022-11-11 NOTE — PROGRESS NOTES
NEUROLOGY FOLLOW UP VISIT  NOTE       [facility] NEUROLOGY [place]  [address] [place] [address]  Tel: [phone]  Fax: [phone]  [email or web address]     Yuliya Feliz,  1960, MRN [id number]  PCP: [place]  Date: 2022      ASSESSMENT & PLAN     Visit Diagnosis  1. Partial symptomatic epilepsy with complex partial seizures, not intractable, without status epilepticus (H)  2. Severe C6-C7 stenosis     Complex partial seizures  62-year-old female with history of HLD, GERD, complex partial seizure, severe C6/C7 stenosis who returns for yearly follow-up.  Her seizures are well controlled and I have recommended:    1.  Continue Lamictal 200 mg twice daily prescriptions were filled for the next year  2.  Check lamotrigine level and hepatic profile  3.  Follow-up will be in 1 year    Severe C6-C7 stenosis  Patient reported generalized muscle twitches along with incoordination and had significant hyperreflexia.  MRI cervical spine showed severe C6/C7 stenosis.  She saw neurosurgery and although they recommended C4-C6 left open-door cervical laminoplasty, she was reluctant.  I have recommended:    1.  Repeat MRI cervical spine  2.  If any cord signal abnormality is seen I would recommend seeing neurosurgery again.  If MRI is unchanged she is agreeable to the idea of seeing neurosurgery in summertime to get cervical laminoplasty done.  3.  Follow-up will be in 1 year    Thank you again for this referral, please feel free to contact me if you have any questions.    Jim Hahn MD  [facility]  (Formerly, [facility]     HISTORY OF PRESENT ILLNESS     Patient is a pleasant 62-year-old female with history of HLD, GERD, complex partial seizure, cervical spine stenosis who returns for yearly follow-up.  She was last seen on 2021 when she was continued on lamotrigine and blood level was checked that was  therapeutic.  She had also complained of some neck pain and had significant hyperreflexia.  MRI cervical spine was done that showed severe C4-C5 neuroforaminal stenosis with left paracentral disc herniation compressing the left hemicord and at C6-C7 there was severe spinal canal stenosis with flattening..  She was referred to neurosurgery and also EMG was ordered that was not done.  Neurosurgery offered a C4-C6 left open-door cervical laminoplasty but patient was reluctant and opted to defer surgery.  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 Her symptoms started in 2010 when she had a nocturnal seizure and at that time had MRI and EEG that were unremarkable. She was started on Keppra for a short duration and afterwards was discontinued. She was doing fine until 2014 when she woke up, and around 8:40 a.m. she went into the bathroom, and afterwards her  heard her hitting the floor. He found her unconscious without significant tonic-clonic activity. She was unconscious for about 10 minutes and was confused. She was taken to the hospital and had a CT of the head that was unremarkable. EEG at that time showed dysrhythmia grade III left temporal and MRI of the head showed the left hippocampal body slightly smaller than the right and likely a normal variant. She was started on Lamictal with good control of seizures     PROBLEM LIST   Patient Active Problem List   Diagnosis Code     Complex Partial Seizure (Dysrhythmia III Lt. Temporal) G40.209     Gastroesophageal reflux disease K21.9     Hyperlipidemia E78.5     Severe C6-C7 stenosis M48.02         PAST MEDICAL & SURGICAL HISTORY     Past Medical History:   Patient  has a past medical history  "of Seizures (H).    Surgical History:  She  has a past surgical history that includes GYN surgery.     SOCIAL HISTORY     Reviewed, and she  reports that she has never smoked. She has never used smokeless tobacco. She reports that she does not currently use alcohol. She reports that she does not use drugs.     FAMILY HISTORY     Reviewed, and family history includes Alcoholism in her father; Cancer in her brother and mother; Parkinsonism in her paternal aunt; Seizure Disorder in her father.     ALLERGIES     Allergies   Allergen Reactions     Rivaroxaban Palpitations and Shortness Of Breath     Cephalexin      Latex      Naproxen          REVIEW OF SYSTEMS     A 12 point review of system was performed and was negative except as outlined in the history of present illness.     HOME MEDICATIONS     Current Outpatient Rx   Medication Sig Dispense Refill     aspirin 81 MG EC tablet Take 81 mg by mouth daily       Black Cohosh 160 MG CAPS        CALCIUM PO        Cholecalciferol (VITAMIN D3 PO)   0 Refill(s), Type: Maintenance       lamoTRIgine (LAMICTAL) 200 MG tablet Take 1 tablet (200 mg) by mouth 2 times daily 180 tablet 3     magnesium oxide (MAG-OX) 100 mg TABS quarter-tab        melatonin 1 MG CAPS        simvastatin (ZOCOR) 20 MG tablet        VITAMIN B COMPLEX-C PO            PHYSICAL EXAM     Vital signs  /69 (BP Location: Left arm, Patient Position: Sitting)   Pulse 51   Ht 1.575 m (5' 2\")   Wt 55.3 kg (122 lb)   BMI 22.31 kg/m      Weight:   122 lbs 0 oz    Patient is alert and oriented x4 in no acute distress. Vital signs were reviewed and are documented in electronic medical record. Neck was supple, no carotid bruits, thyromegaly, JVD, or lymphadenopathy was noted.   NEUROLOGY EXAM:    Patient s speech was normal with no aphasia or dysarthria. Mentation, and affect were also normal.     Funduscopic exam was normal, with normal cup to disc ratio. Cranial nerves II -XII were intact.     Patient had " normal mass, tone and motor strength was 5/5 in all extremities without pronator drift.     Sensation was intact to light touch, pinprick, and vibratory sensation.     Reflexes were 3+ symmetrical with downgoing toes.     No dysmetria noted on FNF or HKS. Romberg was negative.    Gait testing was normal. Able to walk on toes/heels. Tandem walk normal.     PERTINENT DIAGNOSTIC STUDIES     Following studies were reviewed:     MRI CERVICAL SPINE 8/7/2021  1.  At C4-C5, there is severe left neural foraminal stenosis.  2.  At C5-C6, a left paracentral protrusion compresses the left hemicord. There is severe left neural foraminal stenosis.  3.  At C6-C7, there is severe spinal canal stenosis with flattening and deformity of the cord. There is moderate right neural foraminal stenosis.  4.  No convincing cord signal abnormalities.  5.  Additional findings as above    (05/10/2017 09:02 CDT MRI Report)  1. No recent infarct, intracranial mass or abnormal intracranial enhancement.  2. No structural or migrational abnormality identified.  3. Within the limits of motion on the study, the mesial temporal structures are within normal limits bilaterally.  4. Stable mild number of small foci of nonspecific nonenhancing T2 signal changes in the supratentorial white matter which is not atypical in a patient of this age. These may reflect foci of nonspecific gliosis or chronic myelin loss or mild chronic small vessel ischemic changes.    EEG 5/9/17: This is abnormal sleep deprived EEG due to paroxysmal slowing of the background in theta range that is maximally expressed in the left hemisphere and suggests nonspecific cerebral dysfunction with predominantly left hemispheric pathology. No sharp activity or rhythmic discharges were seen.     PERTINENT LABS  Following labs were reviewed:  No visits with results within 3 Month(s) from this visit.   Latest known visit with results is:   Lab Requisition on 07/15/2022   Component Date Value      Interpretation 07/15/2022 Negative for Intraepithelial Lesion or Malignancy (NILM)      Comment 07/15/2022                      Value:This result contains rich text formatting which cannot be displayed here.     Specimen Adequacy 07/15/2022 Satisfactory for evaluation, endocerv/transformation zone component absent, atrophy      Clinical Information 07/15/2022                      Value:This result contains rich text formatting which cannot be displayed here.     LMP/Menopause Date 07/15/2022                      Value:This result contains rich text formatting which cannot be displayed here.     Reflex Testing 07/15/2022 Yes regardless of result      Previous Abnormal? 07/15/2022                      Value:This result contains rich text formatting which cannot be displayed here.     Performing Labs 07/15/2022                      Value:This result contains rich text formatting which cannot be displayed here.     Cholesterol 07/15/2022 228 (H)      Triglycerides 07/15/2022 169 (H)      Direct Measure HDL 07/15/2022 91      LDL Cholesterol Calculat* 07/15/2022 103 (H)      Non HDL Cholesterol 07/15/2022 137 (H)      Sodium 07/15/2022 139      Potassium 07/15/2022 4.1      Creatinine 07/15/2022 0.72      Urea Nitrogen 07/15/2022 14.0      Chloride 07/15/2022 101      Carbon Dioxide (CO2) 07/15/2022 28      Anion Gap 07/15/2022 10      Glucose 07/15/2022 87      Calcium 07/15/2022 9.7      Protein Total 07/15/2022 6.4      Albumin 07/15/2022 4.5      Bilirubin Total 07/15/2022 0.3      Alkaline Phosphatase 07/15/2022 54      AST 07/15/2022 19      ALT 07/15/2022 17      GFR Estimate 07/15/2022 >90      Other HR HPV 07/15/2022 Negative      HPV16 DNA 07/15/2022 Negative      HPV18 DNA 07/15/2022 Negative      FINAL DIAGNOSIS 07/15/2022                      Value:This result contains rich text formatting which cannot be displayed here.         Total time spent for face to face visit, reviewing labs/imaging studies,  counseling and coordination of care was: 45 Minutes spent on the date of the encounter doing chart review, review of outside records, review of test results, interpretation of tests, patient visit and documentation       This note was dictated using voice recognition software.  Any grammatical or context distortions are unintentional and inherent to the software.    Orders Placed This Encounter   Procedures     MR Cervical Spine w/o Contrast     Lamotrigine Level     Hepatic function panel      New Prescriptions    No medications on file     Modified Medications    Modified Medication Previous Medication    LAMOTRIGINE (LAMICTAL) 200 MG TABLET lamoTRIgine (LAMICTAL) 200 MG tablet       Take 1 tablet (200 mg) by mouth 2 times daily    Take 1 tablet (200 mg) by mouth 2 times daily

## 2022-11-11 NOTE — NURSING NOTE
Chief Complaint   Patient presents with     Seizures     Annual follow up     Glo Foreman CMA on 11/11/2022 at 11:10 AM

## 2022-11-11 NOTE — LETTER
November 13, 2022      Yuliyajulianne Feliz  [address] [place] [address]        Dear ,    We are writing to inform you of your test results.    Your test results fall within the expected range(s) or remain unchanged from previous results.  Please continue with current treatment plan.    Resulted Orders   Lamotrigine Level   Result Value Ref Range    Lamotrigine 10.4 3.0 - 15.0 ug/mL      Comment:      INTERPRETIVE INFORMATION:  Lamotrigine    Therapeutic Range:  3.0-15.0 ug/mL              Toxic:  Greater than or equal to 20 ug/mL     Pharmacokinetics varies widely, particularly with   co-medications and/or compromised renal function.  Adverse   effects may include dizziness, somnolence, nausea and   vomiting.  Performed By: [facility]  [address] [place] [id number]  : Samuel Rendon MD, PhD   Hepatic function panel   Result Value Ref Range    Protein Total 6.9 6.4 - 8.3 g/dL    Albumin 4.6 3.5 - 5.2 g/dL    Bilirubin Total 0.2 <=1.2 mg/dL    Alkaline Phosphatase 56 35 - 104 U/L    AST 18 10 - 35 U/L    ALT 16 10 - 35 U/L    Bilirubin Direct <0.20 0.00 - 0.30 mg/dL       If you have any questions or concerns, please call the clinic at the number listed above.       Sincerely,      Jim Hahn MD

## 2022-11-13 LAB — LAMOTRIGINE SERPL-MCNC: 10.4 UG/ML

## 2022-12-13 ENCOUNTER — TELEPHONE (OUTPATIENT)
Dept: NEUROLOGY | Facility: CLINIC | Age: 62
End: 2022-12-13

## 2022-12-13 NOTE — TELEPHONE ENCOUNTER
MRI order faxed to [facility].   RightFax confirmed sent.     LVM to update patient/spouse that order has been faxed to [facility].     Gabriel Jurado 12/13/2022 8:46 AM

## 2022-12-13 NOTE — TELEPHONE ENCOUNTER
[facility] Call Center    Phone Message    May a detailed message be left on voicemail: yes     Reason for Call:     Kendra pt's spouse called states that pt will need a open sided MRI order be faxed to [facility], please let kendra know when this is done    Action Taken: Other: [facility] neurolog[facility]    Travel Screening: Not Applicable

## 2022-12-22 ENCOUNTER — TELEPHONE (OUTPATIENT)
Dept: NEUROLOGY | Facility: CLINIC | Age: 62
End: 2022-12-22

## 2022-12-22 DIAGNOSIS — M48.02 CERVICAL STENOSIS OF SPINAL CANAL: Primary | ICD-10-CM

## 2022-12-22 NOTE — TELEPHONE ENCOUNTER
Patient: Yuliya SabillonTray BANGB: 1960  Sex: Female  Phone: [phone]    [facility] MRN: [id number]  Exam Date: 12/20/2022     EXAM: MRI CERVICAL SPINE WITHOUT CONTRAST    CLINICAL INFORMATION: Neck pain on both sides.    TECHNICAL INFORMATION: T1 and T2 FSE and STIR sagittal thin sections through the cervical spine with T2 FSE and GRE axial sections at selected levels. Additional right/left sagittal oblique T2 images through the neural foramina were obtained. Images obtained on a 1.2 Evelia open recumbent scanner.    INTERPRETATION: Partially visualized intracranial structures appear normal. No Chiari malformation. Normal signal in the cervical spinal cord. Normal cervical lordosis. Vertebral body heights are maintained. Normal atlantodental and atlantooccipital articulations. Subcentimeter T2 hyperintense left thyroid nodule; based on appearance no further characterization or follow-up is required. Paraspinal soft tissues appear normal.    C2-3: Minimal right facet degeneration. No central or foraminal stenosis.    C3-4: Mild disc degeneration, uncovertebral spurring, normal facet joints. No central or significant foraminal stenosis.    C4-5: Moderate disc degeneration with mild degenerative endplate inflammation, dorsal disc bulge and uncovertebral spurring, normal facet joints. Mild to moderate central stenosis. Mild to moderate right and severe left foraminal stenosis.    C5-6: Severe disc degeneration, left paracentral 3 mm disc protrusion indenting the ventral cord, uncovertebral spurring, normal facet joints. Moderate central stenosis. Moderate right and severe left foraminal stenosis.    C6-7: Moderate to severe disc degeneration, disc bulge and endplate/uncovertebral spurring, normal facet joints. Moderate to severe central stenosis and subtle cord impingement. Moderate to severe right and mild left foraminal stenosis.    C7-T1: Mild disc degeneration, mild right facet hypertrophy. No central  or foraminal stenosis.    T1-2: Mild right posterolateral endplate spurring, minimal right facet degeneration. No central or foraminal stenosis.    T2-3, T3-4: Mild bilateral facet degeneration. No central stenosis. Mild right foraminal stenosis.    CONCLUSION: Multilevel spondylosis with the following notable findings:    1. C6-7 moderate to severe central stenosis with subtle cord impingement, also moderate C5-6 and mild to moderate C4-5 with cord contouring.    2. Chronic foraminal stenosis, at least moderate to severe left C4-5, left C5-6, right C6-7.    3. Moderate/severe disc degeneration from C4-5 to C6-7, with mild C4-5 degenerative endplate edema.    4. Multilevel facet degeneration without active inflammation.        Electronically signed on 12/21/2022 9:56:00 AM by Wilmer Tee M.D.

## 2022-12-25 NOTE — TELEPHONE ENCOUNTER
MRI cervical spine confirms severe C6-C7 stenosis.  Previously she was evaluated by neurosurgery and they had recommended laminoplasty but she was reluctant.  I would recommend seeing neurosurgery again.  See orders

## 2022-12-26 NOTE — TELEPHONE ENCOUNTER
Called and spoke with patient and gave her the results and recommendation. She verbalized understanding and states that she has an appointment with neurosurgery on 1/6/23.

## 2023-01-06 ENCOUNTER — OFFICE VISIT (OUTPATIENT)
Dept: NEUROSURGERY | Facility: CLINIC | Age: 63
End: 2023-01-06
Attending: PSYCHIATRY & NEUROLOGY
Payer: COMMERCIAL

## 2023-01-06 VITALS
DIASTOLIC BLOOD PRESSURE: 60 MMHG | WEIGHT: 122 LBS | SYSTOLIC BLOOD PRESSURE: 116 MMHG | HEART RATE: 77 BPM | HEIGHT: 62 IN | BODY MASS INDEX: 22.45 KG/M2 | OXYGEN SATURATION: 95 %

## 2023-01-06 DIAGNOSIS — M48.02 CERVICAL STENOSIS OF SPINAL CANAL: ICD-10-CM

## 2023-01-06 PROCEDURE — 99213 OFFICE O/P EST LOW 20 MIN: CPT | Performed by: NURSE PRACTITIONER

## 2023-01-06 NOTE — PROGRESS NOTES
Neurosurgery clinic note:      A/P:  Yuliya is a 61yo right-handed patient with cervical myelopathy last seen for surgical opinion by Dr Begum in August of 2021 and offered a C4-6 laminoplasty, who is here to re-establish care. She did not wish to pursue surgery when last seen, however she reports ongoing bilateral posterior neck pain, difficulty opening jars, more frequent falls, some intermittent jerking/hypersensitive movements of her extremities.     We had a lengthy discussion regarding her symptoms and imaging results. She has severe central canal stenosis with subtle cord impingement at C6-7, with moderate C5-6 and mild to moderate central canal stenosis C4-5 with cord contouring. She has chronic moderate to severe left C4-5 foraminal stenosis, left C5-6, and right C6-7.  She has some signs of myelopathy on exam.     We talked about treatment options including continuing to watch symptoms vs surgical consult now. Explained that given her falls, she is at higher risk of cord injury with her severe stenosis. Ultimately she elects to have a surgical consult with Dr Contreras to review options for surgery. Discussed with Dr Contreras at time of visit who feels that she would be a laminoplasty candidate. Goal of a surgery would be to preserve her current functioning and help prevent future spinal cord injury.    Plan:  1. Follow-up Dr Contreras to discuss Laminoplasty surgery      Exam:    General: seated in NAD, appears comfortable    Neuro: PERRL, EOMs intact without nystagmus, face is symmetrical, tongue is midline, hearing intact bilaterally, shoulder shrug is strong and equal    Strength:  Deltoids: 5/5 right, 5/5 left  Triceps: 5/5 right, 5/5 left  Biceps: 5/5 right, 5/5 left  Handgrips: 5/5 right, 5/5 left  Intrinsics: 5/5 right, 5/5 left  Extensors: 5/5 right, 5/5 left  Hip flexors: 5/5 right, 5/5 left  Quads: 5/5 right, 5/5 left  Hamstrings: 5/5 right, 5/5 left  Dorsiflexion: 5/5 right, 5/5 left  Plantarflexion  5/5 right, 5/5 left  EHL: 5/5 right, 5/5 left    Sensation is intact to light touch throughout upper and lower extremities bilaterally    Reflexes:  3+ bilaterally throughout the upper and lower extremities    + bilateral dahl  Clonus on left  Negative babinski    Gait is smooth and coordinated.   Poor tandem      Imaging:  Personally reviewed the following imaging studies today. Imaging also shown to patient at time of appt.    MRI cspine 12/20/22  c6-7 moderate to severe central canal stenosis with subtle cord impingement, also moderate C5-6 and mild to moderate C4-5 with mild cord contouring.  Chronic foraminal stenosis, at least moderate to severe left C4-5, left C5-6, right C6-7  Moderate/severe disc degeneration from C4-5 an C6-7 with mild C4-5 degenerative endplate edema. Multilevel facet degeneration        Crystal Moody FNP-C  [facility] Neurosurgery  O. [phone]

## 2023-01-06 NOTE — LETTER
1/6/2023         RE: Yuliya Feliz  [address] [place] [address]        Dear Colleague,    Thank you for referring your patient, Yuliya Feliz, to the [facility] SP[facility]UROS[facility]GERY. Please see a copy of my visit note below.    Neurosurgery clinic note:      A/P:  Yuliya is a 63yo right-handed patient with cervical myelopathy last seen for surgical opinion by Dr Begum in August of 2021 and offered a C4-6 laminoplasty, who is here to re-establish care. She did not wish to pursue surgery when last seen, however she reports ongoing bilateral posterior neck pain, difficulty opening jars, more frequent falls, some intermittent jerking/hypersensitive movements of her extremities.     We had a lengthy discussion regarding her symptoms and imaging results. She has severe central canal stenosis with subtle cord impingement at C6-7, with moderate C5-6 and mild to moderate central canal stenosis C4-5 with cord contouring. She has chronic moderate to severe left C4-5 foraminal stenosis, left C5-6, and right C6-7.  She has some signs of myelopathy on exam.     We talked about treatment options including continuing to watch symptoms vs surgical consult now. Explained that given her falls, she is at higher risk of cord injury with her severe stenosis. Ultimately she elects to have a surgical consult with Dr Contreras to review options for surgery. Discussed with Dr Contreras at time of visit who feels that she would be a laminoplasty candidate. Goal of a surgery would be to preserve her current functioning and help prevent future spinal cord injury.    Plan:  1. Follow-up Dr Contreras to discuss Laminoplasty surgery      Exam:    General: seated in NAD, appears comfortable    Neuro: PERRL, EOMs intact without nystagmus, face is symmetrical, tongue is midline, hearing intact bilaterally, shoulder shrug is strong and equal    Strength:  Deltoids: 5/5 right, 5/5 left  Triceps: 5/5 right, 5/5 left  Biceps: 5/5 right,  5/5 left  Handgrips: 5/5 right, 5/5 left  Intrinsics: 5/5 right, 5/5 left  Extensors: 5/5 right, 5/5 left  Hip flexors: 5/5 right, 5/5 left  Quads: 5/5 right, 5/5 left  Hamstrings: 5/5 right, 5/5 left  Dorsiflexion: 5/5 right, 5/5 left  Plantarflexion 5/5 right, 5/5 left  EHL: 5/5 right, 5/5 left    Sensation is intact to light touch throughout upper and lower extremities bilaterally    Reflexes:  3+ bilaterally throughout the upper and lower extremities    + bilateral dahl  Clonus on left  Negative babinski    Gait is smooth and coordinated.   Poor tandem      Imaging:  Personally reviewed the following imaging studies today. Imaging also shown to patient at time of appt.    MRI cspine 12/20/22  c6-7 moderate to severe central canal stenosis with subtle cord impingement, also moderate C5-6 and mild to moderate C4-5 with mild cord contouring.  Chronic foraminal stenosis, at least moderate to severe left C4-5, left C5-6, right C6-7  Moderate/severe disc degeneration from C4-5 an C6-7 with mild C4-5 degenerative endplate edema. Multilevel facet degeneration        Crystal Moody FNP-C  [facility] Neurosurgery  O. [phone]          Again, thank you for allowing me to participate in the care of your patient.        Sincerely,        RAISA Titus CNP

## 2023-01-25 ENCOUNTER — OFFICE VISIT (OUTPATIENT)
Dept: NEUROSURGERY | Facility: CLINIC | Age: 63
End: 2023-01-25
Payer: COMMERCIAL

## 2023-01-25 VITALS
DIASTOLIC BLOOD PRESSURE: 57 MMHG | HEART RATE: 70 BPM | BODY MASS INDEX: 22.45 KG/M2 | SYSTOLIC BLOOD PRESSURE: 120 MMHG | WEIGHT: 122 LBS | OXYGEN SATURATION: 96 % | HEIGHT: 62 IN

## 2023-01-25 DIAGNOSIS — M48.02 SPINAL STENOSIS IN CERVICAL REGION: Primary | ICD-10-CM

## 2023-01-25 PROCEDURE — 99214 OFFICE O/P EST MOD 30 MIN: CPT | Performed by: SURGERY

## 2023-01-25 NOTE — LETTER
"    1/25/2023         RE: Yuliya Feliz  [address] [place] [address]        Dear Colleague,    Thank you for referring your patient, Yuliya Feliz, to the [facility] SPINE [facility] NEUROSURGERY. Please see a copy of my visit note below.    The patient is a 62-year-old female.  She is here with her .  Currently she says that she does not have any weakness, however she does have some trouble opening jars, which she attributes to \"old age.\"  She gets numbness in her arms in the morning.  She does some stumbling.  She has fallen twice.  She does not have trouble with bladder control.  On MRI she has spinal stenosis at C4-5 and C5-6 and C6-7.  The C6-7 level is described by the reading radiologist as severe.  I did show the pictures to the patient and her .  We talked about a laminoplasty C4-5-6-7.  We included in the discussion the nature of the problem, nature of the surgery, rationale for doing it, goals, benefits, alternatives, and significant risks and complications.  I answered all their questions.  They said they were satisfied with the explanation and understood.  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  She understands the benefits and risks of surgery now versus surgery later.  She is going to call us to schedule surgery over the phone.  She is satisfied with the plan.  Total time 25 minutes, more than 50% spent counseling and/or coordinating care.      Again, thank you for allowing me to participate in the care of your patient.        Sincerely,        Lawson Contreras MD    "

## 2023-01-25 NOTE — PROGRESS NOTES
"The patient is a 62-year-old female.  She is here with her .  Currently she says that she does not have any weakness, however she does have some trouble opening jars, which she attributes to \"old age.\"  She gets numbness in her arms in the morning.  She does some stumbling.  She has fallen twice.  She does not have trouble with bladder control.  On MRI she has spinal stenosis at C4-5 and C5-6 and C6-7.  The C6-7 level is described by the reading radiologist as severe.  I did show the pictures to the patient and her .  We talked about a laminoplasty C4-5-6-7.  We included in the discussion the nature of the problem, nature of the surgery, rationale for doing it, goals, benefits, alternatives, and significant risks and complications.  I answered all their questions.  They said they were satisfied with the explanation and understood.  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  She understands the benefits and risks of surgery now versus surgery later.  She is going to call us to schedule surgery over the phone.  She is satisfied with the plan.  Total time 25 minutes, more than 50% spent counseling and/or coordinating care.  "

## 2023-08-25 ENCOUNTER — LAB REQUISITION (OUTPATIENT)
Dept: LAB | Facility: CLINIC | Age: 63
End: 2023-08-25

## 2023-08-25 DIAGNOSIS — E78.2 MIXED HYPERLIPIDEMIA: ICD-10-CM

## 2023-08-25 LAB
ANION GAP SERPL CALCULATED.3IONS-SCNC: 12 MMOL/L (ref 7–15)
BUN SERPL-MCNC: 11.3 MG/DL (ref 8–23)
CALCIUM SERPL-MCNC: 9.9 MG/DL (ref 8.8–10.2)
CHLORIDE SERPL-SCNC: 102 MMOL/L (ref 98–107)
CHOLEST SERPL-MCNC: 235 MG/DL
CREAT SERPL-MCNC: 0.82 MG/DL (ref 0.51–0.95)
DEPRECATED HCO3 PLAS-SCNC: 28 MMOL/L (ref 22–29)
GFR SERPL CREATININE-BSD FRML MDRD: 81 ML/MIN/1.73M2
GLUCOSE SERPL-MCNC: 123 MG/DL (ref 70–99)
HDLC SERPL-MCNC: 105 MG/DL
LDLC SERPL CALC-MCNC: 112 MG/DL
NONHDLC SERPL-MCNC: 130 MG/DL
POTASSIUM SERPL-SCNC: 4.5 MMOL/L (ref 3.4–5.3)
SODIUM SERPL-SCNC: 142 MMOL/L (ref 136–145)
TRIGL SERPL-MCNC: 92 MG/DL

## 2023-08-25 PROCEDURE — 80061 LIPID PANEL: CPT | Performed by: PHYSICIAN ASSISTANT

## 2023-08-25 PROCEDURE — 82310 ASSAY OF CALCIUM: CPT | Performed by: PHYSICIAN ASSISTANT

## 2023-09-14 ENCOUNTER — TRANSCRIBE ORDERS (OUTPATIENT)
Dept: OTHER | Age: 63
End: 2023-09-14

## 2023-09-14 DIAGNOSIS — R13.10 DYSPHAGIA: Primary | ICD-10-CM

## 2023-10-13 ENCOUNTER — LAB REQUISITION (OUTPATIENT)
Dept: LAB | Facility: CLINIC | Age: 63
End: 2023-10-13

## 2023-10-13 DIAGNOSIS — J02.9 ACUTE PHARYNGITIS, UNSPECIFIED: ICD-10-CM

## 2023-10-13 PROCEDURE — 87081 CULTURE SCREEN ONLY: CPT | Performed by: FAMILY MEDICINE

## 2023-10-16 LAB — BACTERIA SPEC CULT: NORMAL

## 2023-10-19 ENCOUNTER — THERAPY VISIT (OUTPATIENT)
Dept: SPEECH THERAPY | Facility: CLINIC | Age: 63
End: 2023-10-19
Attending: PHYSICIAN ASSISTANT
Payer: COMMERCIAL

## 2023-10-19 DIAGNOSIS — R13.10 DYSPHAGIA: ICD-10-CM

## 2023-10-19 PROCEDURE — 92610 EVALUATE SWALLOWING FUNCTION: CPT | Mod: GN

## 2023-10-19 PROCEDURE — 92526 ORAL FUNCTION THERAPY: CPT | Mod: GN

## 2023-10-19 NOTE — PROGRESS NOTES
"SPEECH LANGUAGE PATHOLOGY EVALUATION    See electronic medical record for Abuse and Falls Screening details.    Subjective   Presenting condition or subjective complaint: Post 0p cervical spinal fusion  Date of onset:   09/04/2023  Relevant medical history:   Ms. Yuliya Feliz is a 63 y.o. with a history of seizure disorder, GERD, HTN, HLD, h/o DVT, depression, who underwent CERVICAL 4- CERVICAL 7 ANTERIOR DISCECTOMY, FUSION, PLATING on 9/5/2023 by Dr. Boland, Odell Sherman MD; anesthesia General, EBL 5 ml, OR time 3 Hr 10 Min 48 Sec with no immediate complications.   Dates & types of surgery: September 5th 2023,spinal stenosis    Prior diagnostic imaging/testing results: Video fluoroscopic swallow study     Prior therapy history for the same diagnosis, illness or injury: No      Living Environment  Social support: With a significant other or spouse   Help at home: Self Cares (home health aide/personal care attendant, family, etc); Assist for driving and community activities  Equipment owned:       Employment: Yes Nutrition services  Hobbies/Interests:      Patient goals for therapy: Swallow, & I have trouble with my voice    Pain assessment: Pain present  Post Op from CSF     Objective     SWALLOW EVALUTION  Dysphagia history: Per discharge note from 9/9/23 \"Post operatively she noted difficulties clearing mucus from throat and later trouble swallowing solids and pills and choking on liquids. Denied fever, SOB, sore throat, rhinorrhea, sinus pain/pressure, headache, myalgias, sick contacts. Started allergy pill as usually takes PTA but was not getting here. Later added mucinex with some relief of mucus. SLP evaluated and recommended 1)Regular solids-initiate with soft/pureed foods 2)Thin liquids-by TSP ONLY 3)Medications crushed in puree. Anticipate swallow function will improve as swelling reduces, and patient to slowly re-introduce normal foods into her diet. Instructed patient to contact her " "MD in 1-2 weeks if swallowing has not improved\".     Current Diet/Method of Nutritional Intake: thin liquids (level 0), soft & bite-sized (level 6)        CLINICAL SWALLOW EVALUATION  Oral Motor Function: generally intact  Dentition: natural dentition  Secretion management: WFL  Mucosal quality: good  Mandibular function: range of motion impaired, brace  Oral labial function: WFL  Lingual function: WFL  Velar function: intact   Buccal function: intact  Laryngeal function: cough, voicing WFL, WFL      Level of assist required for feeding: no assistance needed   Textures Trialed:   Clinical Swallow Eval: Thin Liquids  Mode of presentation: cup, self-fed   Volume presented: 4 oz  Preparatory Phase: WFL  Oral Phase: WFL  Pharyngeal phase of swallow: impaired, repeated swallows, throat clearing   Strategies trialed during procedure: JUNIOR SLP CLINICAL EVAL STRATEGIES: single sips    Diagnostic statement: Single sips    Diagnostic statement: Throat clear observed with sequential sips only, no overt s/sx of aspiration/penetration with single sips.    Clinical Swallow Eval: Purees  Mode of presentation: spoon, self-fed   Volume presented: 3 tsp  Preparatory Phase: WFL  Oral Phase: WFL  Pharyngeal phase of swallow: intact, repeated swallows   Diagnostic statement: No overt s/sx of aspiration/penetration observed.     Clinical Swallow Eval: Solids  Mode of presentation: self-fed   Volume presented: 1 bite of amilcar cracker  Preparatory Phase: WFL  Oral Phase: WFL  Pharyngeal phase of swallow: intact, feeling of something stuck in throat, repeated swallows, throat clearing   Diagnostic statement: overt /sx of penetration/aspiration observed from 2x throat clear and reported globus sensation.      ESOPHAGEAL PHASE OF SWALLOW  patient reports symptoms of esophageal dysphagia  Hx of GERD      SWALLOW ASSESSMENT CLINICAL IMPRESSIONS AND RATIONALE  Diet Consistency Recommendations: thin liquids (level 0), soft & bite-sized (level 6)  "   Recommended Feeding/Eating Techniques: small bolus size, no straws, slow rate of intake, alternate food and liquid intake, double swallow, maintain upright sitting position for eating, maintain upright posture during/after eating for 30 minutes, minimize distractions during oral intake   Medication Administration Recommendations: whole with puree  Instrumental Assessment Recommendations: instrumental evaluation not recommended at this time     Assessment & Plan   CLINICAL IMPRESSIONS   Medical Diagnosis:   status post cervical spinal fusion   Treatment Diagnosis:   mild to moderate oropharyngeal dysphagia   Impression/Assessment: Pt is a 63 year old female with swallowing complaints. The following significant findings have been identified: impaired swallowing, characterized by coughing following sequential sips of thin liquid and throat clearing following solid consistency trials . Identified deficits interfere with their ability to consume an oral diet and maintain nutrition as compared to previous level of function.    PLAN OF CARE  Treatment Interventions: Swallowing dysfunction and/or oral function for feeding    Prognosis to achieve stated therapy goals is good   Rehab potential is impacted by: family/caregiver support, patient motivation, pending medical intervention    Long Term Goals   Goal Identifier: strategies   Goal Description: Patient will independently implement safe swallowing techniques to tolerate a regular diet with thin liquids without c/o coughing/choking across one month per patient report to improve safety of PO intake.   Rationale: To maximize safety, ease and/or independence of oral intake;   Target Date: 01/16/2023      Frequency of Treatment:  1-2x monthly follow ups  Duration of Treatment:   2 months      Recommended Referrals to Other Professionals:  none  Education Assessment: Patient;Significant Other;Listening;Reading;No Barriers to Learning;        Risks and benefits of  evaluation/treatment have been explained.   Patient/Family/caregiver agrees with Plan of Care.     Evaluation Time: Oral/Pharyngeal Swallow Function; 32 minutes        Present: Not applicable     Signing Clinician: Scarlett Gamino, SLP

## 2023-11-24 ENCOUNTER — ANCILLARY PROCEDURE (OUTPATIENT)
Dept: MAMMOGRAPHY | Facility: HOSPITAL | Age: 63
End: 2023-11-24
Attending: PHYSICIAN ASSISTANT
Payer: COMMERCIAL

## 2023-11-24 DIAGNOSIS — Z12.31 VISIT FOR SCREENING MAMMOGRAM: ICD-10-CM

## 2023-11-24 PROCEDURE — 77067 SCR MAMMO BI INCL CAD: CPT

## 2023-12-12 DIAGNOSIS — G40.209 PARTIAL SYMPTOMATIC EPILEPSY WITH COMPLEX PARTIAL SEIZURES, NOT INTRACTABLE, WITHOUT STATUS EPILEPTICUS (H): ICD-10-CM

## 2023-12-12 RX ORDER — LAMOTRIGINE 200 MG/1
200 TABLET ORAL 2 TIMES DAILY
Qty: 180 TABLET | Refills: 0 | Status: SHIPPED | OUTPATIENT
Start: 2023-12-12 | End: 2024-03-29

## 2023-12-12 NOTE — TELEPHONE ENCOUNTER
Refill request for: lamoTRIgine (LAMICTAL) 200 MG tablet    Directions: Take 1 tablet (200 mg) by mouth 2 times daily     LOV: 11/11/22  NOV: 3/29/24    90 day supply with 0 refills Medication T'd for review and signature  Glo Foreman CMA on 12/12/2023 at 2:59 PM  [facility]

## 2023-12-15 ENCOUNTER — THERAPY VISIT (OUTPATIENT)
Dept: SPEECH THERAPY | Facility: CLINIC | Age: 63
End: 2023-12-15
Attending: PHYSICIAN ASSISTANT
Payer: COMMERCIAL

## 2023-12-15 DIAGNOSIS — R13.12 OROPHARYNGEAL DYSPHAGIA: Primary | ICD-10-CM

## 2023-12-15 PROCEDURE — 92526 ORAL FUNCTION THERAPY: CPT | Mod: GN

## 2023-12-26 DIAGNOSIS — R13.10 DYSPHAGIA, UNSPECIFIED TYPE: Primary | ICD-10-CM

## 2024-01-05 ENCOUNTER — HOSPITAL ENCOUNTER (OUTPATIENT)
Dept: SPEECH THERAPY | Facility: HOSPITAL | Age: 64
Discharge: HOME | End: 2024-01-05
Attending: PSYCHIATRY & NEUROLOGY
Payer: COMMERCIAL

## 2024-01-05 ENCOUNTER — HOSPITAL ENCOUNTER (OUTPATIENT)
Dept: RADIOLOGY | Facility: HOSPITAL | Age: 64
Discharge: HOME | End: 2024-01-05
Attending: PSYCHIATRY & NEUROLOGY | Admitting: PSYCHIATRY & NEUROLOGY
Payer: COMMERCIAL

## 2024-01-05 DIAGNOSIS — R13.10 DYSPHAGIA, UNSPECIFIED TYPE: ICD-10-CM

## 2024-01-05 PROCEDURE — 92610 EVALUATE SWALLOWING FUNCTION: CPT | Mod: GN

## 2024-01-05 PROCEDURE — 74230 X-RAY XM SWLNG FUNCJ C+: CPT

## 2024-01-05 PROCEDURE — 92611 MOTION FLUOROSCOPY/SWALLOW: CPT | Mod: GN

## 2024-01-05 RX ORDER — BARIUM SULFATE 400 MG/ML
SUSPENSION ORAL ONCE
Status: COMPLETED | OUTPATIENT
Start: 2024-01-05 | End: 2024-01-05

## 2024-01-05 RX ADMIN — BARIUM SULFATE 60 ML: 400 SUSPENSION ORAL at 11:08

## 2024-01-05 RX ADMIN — BARIUM SULFATE 60 ML: 400 SUSPENSION ORAL at 11:10

## 2024-01-05 NOTE — PROGRESS NOTES
SPEECH LANGUAGE PATHOLOGY EVALUATION    See electronic medical record for Abuse and Falls Screening details.    Subjective     Presenting condition or subjective complaint: dysphagia to solids, dysphagia to larger pills; detailed below in history and Clinical Swallow Evaluation section  Date of onset: 9/8/23    MD: Dr. Hahn  Relevant medical history: Patient is a 63 year old s/p cervical fusion and plating in September with post-op dysphagia. VFSS completed 9/8/23: Patient presents with mild pharyngeal dysphagia secondary to ACDF procedure as characterized by moderate pharyngeal residue. Patient exhibited consistent lasting penetration with thin liquids and aspiration on thin liquids x2. Aspiration occurred from pharyngeal residue between trials and only triggered a delayed sensory response during 1/2 aspiration events.  Shallow penetration occurred during multiple secondary swallows in attempt to clear pharyngeal residue with mildly thick liquids History per BSS: 63 y.o. with a history of seizure disorder, GERD, HTN, HLD, h/o DVT, depression, who underwent CERVICAL 4- CERVICAL 7 ANTERIOR DISCECTOMY, FUSION, PLATING on 9/5/2023 by Dr. Boland, Odell Sherman MD; anesthesia General, EBL 5 ml, OR time 3 Hr 10 Min 48 Sec with no immediate complications.   Dates & types of surgery: September 5th 2023,spinal stenosis      Objective     SWALLOW EVALUATION  Current Diet/Method of Nutritional Intake: thin liquids (level 0), regular diet      CLINICAL SWALLOW EVALUATION  BSS completed 10/19/23 and treatment session 12/15/23. Repeat BSS not completed today due to recent assessments. Treatment 12/15/23 states: Pt reports today that swallow was improving after visit in October and as swelling reduced, but now feels like it has worsened and is chokin/foods getting stuck. Pt denies difficulty with liquids and says no coughing with liquids. SLP observed Pt trial solid amilcar cracker this date with throat clearning and  repeated swallows and reported globus sensation. Pt reports that liquid wash helps sometimes but not all. Pt reports softer foods are fine, but she wants to be able to get back to eating steak and pork and is afraid to do this now d/t concern for choking. SLP re-educated Pt on safe swallow strategies including alternating with liquids, extra small bolus size, and sitting straight up, hard swallow and extra swallows as needed. SLP suggested repeat VFSS based on Pt concerns and previous VFSS SLP recommending if swallow does not improve. Pt agreeable to this plan and to follow up with OP in January pending VFSS results.   Oral Motor Function: generally intact  Oral labial function: WFL  Lingual function: WFL  Buccal function: intact     Level of assist required for feeding: no assistance needed     VIDEOFLUOROSCOPIC SWALLOW STUDY  Radiologist: Dr. Cardoza  Views Taken: left lateral   [facility] location of procedure: [facility]     VFSS textures trialed:   VFSS Eval: Thin Liquids  Mode of Presentation: cup, straw, self-fed   Preparatory Phase: WFL  Oral Phase: WFL, reduced tongue base retraction  Bolus Location When Swallow Initiated: valleculae, posterior laryngeal surface of epiglottis  Pharyngeal Phase: WFL, mild residue between swallows; epiglottic inversion posterior inferior to complete  Rosenbeck's Penetration Aspiration Scale: 1 - no aspiration, contrast does not enter airway    VFSS Eval: Purees  Mode of Presentation: spoon, self-fed   Preparatory Phase: WFL  Oral Phase: WFL, reduced tongue base retraction  Bolus Location When Swallow Initiated: valleculae  Pharyngeal Phase: WFL; moderate valleculae stasis clears with 2-3 swallows; epiglottic inversion posterior inferior to complete  Rosenbeck s Penetration Aspiration Scale: 1 - no aspiration, contrast does not enter airway    VFSS Eval: Solids  Mode of Presentation: self-fed   Preparatory Phase: WFL  Oral Phase: WFL  Bolus Location When Swallow  Initiated: valleculae  Pharyngeal Phase: WFL, moderate valleculae stasis with clearing with 2-3 swallows; epiglottic inversion posterior inferior to complete  Rosenbeck s Penetration Aspiration Scale: 1 - no aspiration, contrast does not enter airway    ESOPHAGEAL PHASE OF SWALLOW  no observed or reported concerns related to esophageal function     SWALLOW ASSESSMENT CLINICAL IMPRESSIONS AND RATIONALE  Diet Consistency Recommendations: thin liquids (level 0), regular diet    Recommended Feeding/Eating Techniques: standard safe eating and drinking strategies-upright for intake, avoid distractions or talking while eating/drinking   Medication Administration Recommendations: as tolerated, patient preference    Assessment & Plan   CLINICAL IMPRESSIONS   Medical Diagnosis: Dysphagia    Treatment Diagnosis: Dysphagia   Impression/Assessment:   Video Swallow Study completed. Patient had no aspiration and rare, shallow transient penetration with consecutive straw sips only. Oropharyngeal swallow function is safe and highly WFL, but reduced efficiency and therefore quality of life/pleasure with eating/drinking is noted given patient report and performance on exam. Tongue base retraction is minimally reduced. Pharyngeal constriction and hyolaryngeal elevation were WFL. Excursion is minimally reduced but WFL. There is no overt pharyngeal edema however this test is not sensitive to subtle changes. Epiglottic inversion is posterior-inferior to complete; appears most impacted by natural curve of spine combined with cervical spine hardware placement/angle. Swallow response is timely. Mastication is safe and complete. Mild pharyngeal stasis, most notable in the valleculae, occurs with thin and mildly thick liquids, clearing with 2nd, spontaneous swallow; Mild to moderate pharyngeal stasis, most notable in the valleculae occurs with puree and solid textures, clearing with 2-3 swallows per bite. AP view revealed largely symmetrical  bolus movement with thin and puree, slight increase on the left versus right with puree texture. Cricopharyngeal function is is unremarkable.  Recommend Regular diet and Thin liquids as tolerated with use of strategies to reduce dysphagia symptoms and discomfort with eating and drinking, including but not limited to: 2-3 swallows per bite, alternate solids and liquids, slow rate of intake, soft/moist foods as needed. Consider esophagram as there are some indications of esophageal dysphagia as well, including residuals with solids noted on exam, and patient reported symptoms and history.     PLAN OF CARE    Recommended Referrals to Other Professionals: Outpatient ST, Consider esophagram  Education Assessment:   Learner/Method: Patient;Family;Listening;No Barriers to Learning;Demonstration  Education Comments: VFSS protocol, results and recommendations    Risks and benefits of evaluation/treatment have been explained.   Patient/Family/caregiver agrees with Plan of Care.     Evaluation Time:    SLP Eval: oral/pharyngeal swallow function, clinical minutes (29177): 8  SLP Eval: VideoFluoroscopic Swallow function Minutes (53942): 15      Signing Clinician: [facility]      Initial Assessment  See Epic Evaluation-

## 2024-01-26 ENCOUNTER — THERAPY VISIT (OUTPATIENT)
Dept: SPEECH THERAPY | Facility: CLINIC | Age: 64
End: 2024-01-26
Attending: PSYCHIATRY & NEUROLOGY
Payer: COMMERCIAL

## 2024-01-26 DIAGNOSIS — R13.10 DYSPHAGIA, UNSPECIFIED TYPE: Primary | ICD-10-CM

## 2024-01-26 PROCEDURE — 92526 ORAL FUNCTION THERAPY: CPT | Mod: GN

## 2024-01-26 NOTE — PROGRESS NOTES
PLAN  Continue therapy per current plan of care.    Beginning/End Dates of Progress Note Reporting Period:     to 01/16/2023    Referring Provider:  No ref. provider found    12/15/23 1180   Appointment Info   Treating Provider Scarlett Gamino MS, CCC-SLP   Total/Authorized Visits 10   Visits Used 2   Medical Diagnosis status post cervical spinal fusion   SLP Tx Diagnosis mild to moderate oropharyngeal dysphagia   Progress Note/Certification   Onset Of Illness/injury Or Date Of Surgery 09/04/23   Therapy Frequency 1-2x monthly follow ups   Predicted Duration 2 months   Progress Note Due Date 01/16/23   Subjective Report   Subjective Report Pt arrived accompanied by , alert and cooperative.   SLP Goals   SLP Goals 1   SLP Goal 1   Goal Identifier strategies   Goal Description Patient will independently implement safe swallowing techniques to tolerate a regular diet with thin liquids without c/o coughing/choking across one month per patient report to improve safety of PO intake.   Rationale To maximize safety, ease and/or independence of oral intake   Target Date 01/16/23   Treatment Interventions (SLP)   Treatment Interventions Treatment Swallow/Oral dysfunction   Treatment Swallow/Oral dysfunction   Treatment of Swallowing Dysfunction &/or Oral Function for Feeding Minutes (29154) 20 Minutes   Skilled Intervention Provided written and verbal information on diet modifications.;Educated patient on swallowing strategies.;Educated patient on risks of aspiration;Demonstrated safe swallow strategies;Assessed oral intake trials   Patient Response/Progress Pt reports today that swallow was improving after visit in October and as swelling reduced, but now feels like it has worsened and is chokin/foods getting stuck. Pt denies difficulty with liquids and says no coughing with liquids. SLP observed Pt trial solid amilcar cracker this date with throat clearning and repeated swallows and reported globus sensation. Pt  reports that liquid wash helps sometimes but not all. Pt reports softer foods are fine, but she wants to be able to get back to eating steak and pork and is afraid to do this now d/t concern for choking. SLP re-educated Pt on safe swallow strategies including alternating with liquids, extra small bolus size, and sitting straight up, hard swallow and extra swallows as needed. SLP suggested repeat VFSS based on Pt concerns and previous VFSS SLP recommending if swallow does not improve. Pt agreeable to this plan and to follow up with OP in January pending VFSS results.   Education   Learner/Method Patient;Significant Other;Listening;Reading;No Barriers to Learning   Education Comments rationale for repeat VFSS   Plan   Home program safe swallow strategies, schedule VFSS   Updates to plan of care F/u in January   Plan for next session f/u strategies, diet, results of VFSS   Total Session Time   Total Treatment Time (sum of timed and untimed services) 20

## 2024-03-18 ENCOUNTER — TELEPHONE (OUTPATIENT)
Dept: NEUROLOGY | Facility: CLINIC | Age: 64
End: 2024-03-18
Payer: COMMERCIAL

## 2024-03-18 NOTE — TELEPHONE ENCOUNTER
Letter done and mailed to address on file  Glo Foreman CMA on 3/18/2024 at 10:30 AM  [facility]

## 2024-03-18 NOTE — LETTER
March 18, 2024      Yuliya Feliz  [address] [place] [address]        To Whom It May Concern:    Yuliya Feliz  has a medical appointment 3/29/24 and will be out of work for the day. Please let us know if there are any questions or concerns.         Sincerely,     Jim Hahn MD

## 2024-03-18 NOTE — TELEPHONE ENCOUNTER
[facility] Call Center    Phone Message    May a detailed message be left on voicemail: yes     Reason for Call: Form or Letter   Type or form/letter needing completion: Excuse from work letter  Provider: [place]   Date form needed: 03/18/2024  Once completed: [address] [place] [address]    Patients  called requesting a letter from clinic to excuse Yuliya from work for her upcoming appointment with Dr Hahn on 03/29/2024. Patients  states that Yuliya has mandatory work training that day and her employer is requesting a letter from the clinic to confirm she will be missing work due to the appointment that day. Letter can be mailed to the patient and she will send to her employer.   Clinic may call  Johny with questions, [phone]    Action Taken: Message routed to:  Other: [facility]    Travel Screening: Not Applicable

## 2024-03-28 PROBLEM — I82.409 DEEP VEIN THROMBOSIS (DVT) OF LOWER EXTREMITY (H): Status: ACTIVE | Noted: 2024-03-28

## 2024-03-29 ENCOUNTER — OFFICE VISIT (OUTPATIENT)
Dept: NEUROLOGY | Facility: CLINIC | Age: 64
End: 2024-03-29
Payer: COMMERCIAL

## 2024-03-29 ENCOUNTER — LAB (OUTPATIENT)
Dept: LAB | Facility: HOSPITAL | Age: 64
End: 2024-03-29
Payer: COMMERCIAL

## 2024-03-29 VITALS
SYSTOLIC BLOOD PRESSURE: 126 MMHG | HEIGHT: 62 IN | DIASTOLIC BLOOD PRESSURE: 62 MMHG | WEIGHT: 112 LBS | HEART RATE: 55 BPM | BODY MASS INDEX: 20.61 KG/M2

## 2024-03-29 DIAGNOSIS — G40.209 EPILEPSY WITH PARTIAL COMPLEX SEIZURES (H): ICD-10-CM

## 2024-03-29 DIAGNOSIS — G40.209 EPILEPSY WITH PARTIAL COMPLEX SEIZURES (H): Primary | ICD-10-CM

## 2024-03-29 LAB
ALBUMIN SERPL BCG-MCNC: 4.5 G/DL (ref 3.5–5.2)
ALP SERPL-CCNC: 61 U/L (ref 40–150)
ALT SERPL W P-5'-P-CCNC: 18 U/L (ref 0–50)
ANION GAP SERPL CALCULATED.3IONS-SCNC: 10 MMOL/L (ref 7–15)
AST SERPL W P-5'-P-CCNC: 20 U/L (ref 0–45)
BILIRUB SERPL-MCNC: 0.3 MG/DL
BUN SERPL-MCNC: 12.9 MG/DL (ref 8–23)
CALCIUM SERPL-MCNC: 9.9 MG/DL (ref 8.8–10.2)
CHLORIDE SERPL-SCNC: 100 MMOL/L (ref 98–107)
CREAT SERPL-MCNC: 0.79 MG/DL (ref 0.51–0.95)
DEPRECATED HCO3 PLAS-SCNC: 29 MMOL/L (ref 22–29)
EGFRCR SERPLBLD CKD-EPI 2021: 84 ML/MIN/1.73M2
GLUCOSE SERPL-MCNC: 91 MG/DL (ref 70–99)
POTASSIUM SERPL-SCNC: 4.2 MMOL/L (ref 3.4–5.3)
PROT SERPL-MCNC: 6.6 G/DL (ref 6.4–8.3)
SODIUM SERPL-SCNC: 139 MMOL/L (ref 135–145)

## 2024-03-29 PROCEDURE — G2211 COMPLEX E/M VISIT ADD ON: HCPCS | Performed by: PSYCHIATRY & NEUROLOGY

## 2024-03-29 PROCEDURE — 36415 COLL VENOUS BLD VENIPUNCTURE: CPT

## 2024-03-29 PROCEDURE — 99214 OFFICE O/P EST MOD 30 MIN: CPT | Performed by: PSYCHIATRY & NEUROLOGY

## 2024-03-29 PROCEDURE — 80175 DRUG SCREEN QUAN LAMOTRIGINE: CPT

## 2024-03-29 PROCEDURE — 80053 COMPREHEN METABOLIC PANEL: CPT

## 2024-03-29 RX ORDER — LAMOTRIGINE 200 MG/1
200 TABLET ORAL 2 TIMES DAILY
Qty: 180 TABLET | Refills: 3 | Status: SHIPPED | OUTPATIENT
Start: 2024-03-29

## 2024-03-29 NOTE — PROGRESS NOTES
NEUROLOGY FOLLOW UP VISIT  NOTE       [facility] NEUROLOGY [place]  [address] [place] [address]  Tel: [phone]  Fax: [phone]  [email or web address]     Yuliya Feliz,  1960, MRN [id number]  PCP: [place]  Date: 2024      ASSESSMENT & PLAN     Visit Diagnosis  Epilepsy with partial complex seizures (H)     Complex partial seizures  64-year-old female with HLD, GERD, C6/C7 stenosis s/p C4-C7 anterior discectomy with fusion complicated by dysphagia who returns for yearly follow-up for her seizures.  Her seizures are well-controlled on current dose of lamotrigine.  I have recommended:    1.  Continue lamotrigine 200 mg twice daily.  Prescriptions were filled  2.  Check lamotrigine level and comprehensive metabolic panel  3.  Follow-up in 1 year    Thank you again for this referral, please feel free to contact me if you have any questions.    Jim Hahn MD  [facility] NEUROLOGY, [place]     HISTORY OF PRESENT ILLNESS     Patient is a 64-year-old female with history of HLD, GERD who is been followed in our clinic for complex partial seizures and severe C6/C7 stenosis.  She was last seen on 2022 and was continued on lamotrigine 200 mg twice daily.  Lamotrigine level was checked and was therapeutic.  During her last visit she also reported muscle twitches along with incoordination and on exam had significant hyperreflexia and MRI of the cervical spine was done that showed severe C6/C7 stenosis.  She saw neurosurgery and although had recommended surgery she was reluctant.  MRI was repeated during her last visit that confirmed severe C6/C7 stenosis and I had again recommended seeing neurosurgery.  She subsequently underwent C4-C7 anterior discectomy with fusion.  Although the surgery went okay afterwards she has developed some swallowing difficulty and is getting speech therapy.  Seizures are well-controlled.  She is somewhat disappointed with her  swallowing issues but is hopeful with therapy and will improve    Briefly patient is a female with a history of hyperlipidemia and gastroesophageal reflux who is being followed in our clinic for complex partial seizure disorder. Her symptoms started in 2010 when she had a nocturnal seizure and at that time had MRI and EEG that were unremarkable. She was started on Keppra for a short duration and afterwards was discontinued. She was doing fine until 2014 when she woke up, and around 8:40 a.m. she went into the bathroom, and afterwards her  heard her hitting the floor. He found her unconscious without significant tonic-clonic activity. She was unconscious for about 10 minutes and was confused. She was taken to the hospital and had a CT of the head that was unremarkable. EEG at that time showed dysrhythmia grade III left temporal and MRI of the head showed the left hippocampal body slightly smaller than the right and likely a normal variant. She was started on Lamictal with good control of seizures     PROBLEM LIST   Patient Active Problem List   Diagnosis Code    Complex Partial Seizure (Dysrhythmia III Lt. Temporal) G40.209    Gastroesophageal reflux disease K21.9    Hyperlipidemia E78.5    Severe C6-C7 stenosis M48.02    Deep vein thrombosis (DVT) of lower extremity (H) I82.409         PAST MEDICAL & SURGICAL HISTORY     Past Medical History:   Patient  has a past medical history of Seizures (H).    Surgical History:  She  has a past surgical history that includes GYN surgery.     SOCIAL HISTORY     Reviewed, and she  reports that she has never smoked. She has never used smokeless tobacco. She reports that she does not currently use alcohol. She reports that she does not use drugs.     FAMILY HISTORY     Reviewed, and family history includes Alcoholism in her father; Cancer in her brother and mother; Parkinsonism in her paternal aunt; Seizure Disorder in her father.     ALLERGIES     Allergies   Allergen  "Reactions    Rivaroxaban Palpitations and Shortness Of Breath    Cephalexin     Latex     Naproxen          REVIEW OF SYSTEMS     A 12 point review of system was performed and was negative except as outlined in the history of present illness.     HOME MEDICATIONS     Current Outpatient Rx   Medication Sig Dispense Refill    aspirin 81 MG EC tablet Take 81 mg by mouth daily      Black Cohosh 160 MG CAPS       CALCIUM PO       Cholecalciferol (VITAMIN D3 PO)   0 Refill(s), Type: Maintenance      lamoTRIgine (LAMICTAL) 200 MG tablet Take 1 tablet (200 mg) by mouth 2 times daily. 180 tablet 0    magnesium oxide (MAG-OX) 100 mg TABS quarter-tab       melatonin 1 MG CAPS       simvastatin (ZOCOR) 40 MG tablet Take 40 mg by mouth at bedtime      VITAMIN B COMPLEX-C PO            PHYSICAL EXAM     Vital signs  /62 (BP Location: Right arm, Patient Position: Sitting)   Pulse 55   Ht 1.575 m (5' 2\")   Wt 50.8 kg (112 lb)   BMI 20.49 kg/m      Weight:   112 lbs 0 oz    Patient is alert and oriented x4 in no acute distress. Vital signs were reviewed and are documented in electronic medical record. Neck was supple, no carotid bruits, thyromegaly, JVD, or lymphadenopathy was noted.   NEUROLOGY EXAM:   Patient s speech was normal with no aphasia or dysarthria. Mentation, and affect were also normal.    Funduscopic exam was normal, with normal cup to disc ratio. Cranial nerves II -XII were intact.    Patient had normal mass, tone and motor strength was 5/5 in all extremities without pronator drift.    Sensation was intact to light touch, pinprick, and vibratory sensation.    Reflexes were 3+ symmetrical with downgoing toes.    No dysmetria noted on FNF or HKS. Romberg was negative.   Gait testing was normal. Able to walk on toes/heels. Tandem walk normal.     PERTINENT DIAGNOSTIC STUDIES     Following studies were reviewed:     MRI CERVICAL SPINE 12/20/2022  Multilevel spondylosis with the following notable findings:  1. " C6-7 moderate to severe central stenosis with subtle cord impingement, also moderate C5-6 and mild to moderate C4-5 with cord contouring.  2. Chronic foraminal stenosis, at least moderate to severe left C4-5, left C5-6, right C6-7.  3. Moderate/severe disc degeneration from C4-5 to C6-7, with mild C4-5 degenerative endplate edema.  4. Multilevel facet degeneration without active inflammation.    MRI CERVICAL SPINE 8/7/2021  1.  At C4-C5, there is severe left neural foraminal stenosis.  2.  At C5-C6, a left paracentral protrusion compresses the left hemicord. There is severe left neural foraminal stenosis.  3.  At C6-C7, there is severe spinal canal stenosis with flattening and deformity of the cord. There is moderate right neural foraminal stenosis.  4.  No convincing cord signal abnormalities.  5.  Additional findings as above     (05/10/2017 09:02 CDT MRI Report)  1. No recent infarct, intracranial mass or abnormal intracranial enhancement.  2. No structural or migrational abnormality identified.  3. Within the limits of motion on the study, the mesial temporal structures are within normal limits bilaterally.  4. Stable mild number of small foci of nonspecific nonenhancing T2 signal changes in the supratentorial white matter which is not atypical in a patient of this age. These may reflect foci of nonspecific gliosis or chronic myelin loss or mild chronic small vessel ischemic changes.    EEG 5/9/17: This is abnormal sleep deprived EEG due to paroxysmal slowing of the background in theta range that is maximally expressed in the left hemisphere and suggests nonspecific cerebral dysfunction with predominantly left hemispheric pathology. No sharp activity or rhythmic discharges were seen.     PERTINENT LABS  Following labs were reviewed:  No visits with results within 3 Month(s) from this visit.   Latest known visit with results is:   Lab Requisition on 10/13/2023   Component Date Value Ref Range Status    Culture  10/13/2023 No beta hemolytic Streptococcus isolated   Final         Total time spent for face to face visit, reviewing labs/imaging studies, counseling and coordination of care was: 30 Minutes spent on the date of the encounter doing chart review, review of outside records, review of test results, interpretation of tests, patient visit, and documentation     The longitudinal plan of care for the diagnosis(es)/condition(s) as documented were addressed during this visit. Due to the added complexity in care, I will continue to support Yuliya in the subsequent management and with ongoing continuity of care.    This note was dictated using voice recognition software.  Any grammatical or context distortions are unintentional and inherent to the software.    No orders of the defined types were placed in this encounter.     New Prescriptions    No medications on file     Modified Medications    No medications on file

## 2024-03-29 NOTE — LETTER
3/29/2024         RE: Yuliya Feliz  [address] [place] [address]        Dear Colleague,    Thank you for referring your patient, Yuliya eFliz, to the [facility] NEUROLOGY CLINIC [place]. Please see a copy of my visit note below.    NEUROLOGY FOLLOW UP VISIT  NOTE       [facility] NEUROLOGY [place]  [address] [place] [address]  Tel: [phone]  Fax: [phone]  [email or web address]     Yuliya Feliz,  1960, MRN [id number]  PCP: [place]  Date: 2024      ASSESSMENT & PLAN     Visit Diagnosis  Epilepsy with partial complex seizures (H)     Complex partial seizures  64-year-old female with HLD, GERD, C6/C7 stenosis s/p C4-C7 anterior discectomy with fusion complicated by dysphagia who returns for yearly follow-up for her seizures.  Her seizures are well-controlled on current dose of lamotrigine.  I have recommended:    1.  Continue lamotrigine 200 mg twice daily.  Prescriptions were filled  2.  Check lamotrigine level and comprehensive metabolic panel  3.  Follow-up in 1 year    Thank you again for this referral, please feel free to contact me if you have any questions.    Jim Hahn MD  [facility] NEUROLOGY, [place]     HISTORY OF PRESENT ILLNESS     Patient is a 64-year-old female with history of HLD, GERD who is been followed in our clinic for complex partial seizures and severe C6/C7 stenosis.  She was last seen on 2022 and was continued on lamotrigine 200 mg twice daily.  Lamotrigine level was checked and was therapeutic.  During her last visit she also reported muscle twitches along with incoordination and on exam had significant hyperreflexia and MRI of the cervical spine was done that showed severe C6/C7 stenosis.  She saw neurosurgery and although had recommended surgery she was reluctant.  MRI was repeated during her last visit that confirmed severe C6/C7 stenosis and I had again recommended seeing neurosurgery.   She subsequently underwent C4-C7 anterior discectomy with fusion.  Although the surgery went okay afterwards she has developed some swallowing difficulty and is getting speech therapy.  Seizures are well-controlled.  She is somewhat disappointed with her swallowing issues but is hopeful with therapy and will improve    Briefly patient is a female with a history of hyperlipidemia and gastroesophageal reflux who is being followed in our clinic for complex partial seizure disorder. Her symptoms started in 2010 when she had a nocturnal seizure and at that time had MRI and EEG that were unremarkable. She was started on Keppra for a short duration and afterwards was discontinued. She was doing fine until 2014 when she woke up, and around 8:40 a.m. she went into the bathroom, and afterwards her  heard her hitting the floor. He found her unconscious without significant tonic-clonic activity. She was unconscious for about 10 minutes and was confused. She was taken to the hospital and had a CT of the head that was unremarkable. EEG at that time showed dysrhythmia grade III left temporal and MRI of the head showed the left hippocampal body slightly smaller than the right and likely a normal variant. She was started on Lamictal with good control of seizures     PROBLEM LIST   Patient Active Problem List   Diagnosis Code     Complex Partial Seizure (Dysrhythmia III Lt. Temporal) G40.209     Gastroesophageal reflux disease K21.9     Hyperlipidemia E78.5     Severe C6-C7 stenosis M48.02     Deep vein thrombosis (DVT) of lower extremity (H) I82.409         PAST MEDICAL & SURGICAL HISTORY     Past Medical History:   Patient  has a past medical history of Seizures (H).    Surgical History:  She  has a past surgical history that includes GYN surgery.     SOCIAL HISTORY     Reviewed, and she  reports that she has never smoked. She has never used smokeless tobacco. She reports that she does not currently use alcohol. She reports  "that she does not use drugs.     FAMILY HISTORY     Reviewed, and family history includes Alcoholism in her father; Cancer in her brother and mother; Parkinsonism in her paternal aunt; Seizure Disorder in her father.     ALLERGIES     Allergies   Allergen Reactions     Rivaroxaban Palpitations and Shortness Of Breath     Cephalexin      Latex      Naproxen          REVIEW OF SYSTEMS     A 12 point review of system was performed and was negative except as outlined in the history of present illness.     HOME MEDICATIONS     Current Outpatient Rx   Medication Sig Dispense Refill     aspirin 81 MG EC tablet Take 81 mg by mouth daily       Black Cohosh 160 MG CAPS        CALCIUM PO        Cholecalciferol (VITAMIN D3 PO)   0 Refill(s), Type: Maintenance       lamoTRIgine (LAMICTAL) 200 MG tablet Take 1 tablet (200 mg) by mouth 2 times daily. 180 tablet 0     magnesium oxide (MAG-OX) 100 mg TABS quarter-tab        melatonin 1 MG CAPS        simvastatin (ZOCOR) 40 MG tablet Take 40 mg by mouth at bedtime       VITAMIN B COMPLEX-C PO            PHYSICAL EXAM     Vital signs  /62 (BP Location: Right arm, Patient Position: Sitting)   Pulse 55   Ht 1.575 m (5' 2\")   Wt 50.8 kg (112 lb)   BMI 20.49 kg/m      Weight:   112 lbs 0 oz    Patient is alert and oriented x4 in no acute distress. Vital signs were reviewed and are documented in electronic medical record. Neck was supple, no carotid bruits, thyromegaly, JVD, or lymphadenopathy was noted.   NEUROLOGY EXAM:    Patient s speech was normal with no aphasia or dysarthria. Mentation, and affect were also normal.     Funduscopic exam was normal, with normal cup to disc ratio. Cranial nerves II -XII were intact.     Patient had normal mass, tone and motor strength was 5/5 in all extremities without pronator drift.     Sensation was intact to light touch, pinprick, and vibratory sensation.     Reflexes were 3+ symmetrical with downgoing toes.     No dysmetria noted on FNF " or HKS. Romberg was negative.    Gait testing was normal. Able to walk on toes/heels. Tandem walk normal.     PERTINENT DIAGNOSTIC STUDIES     Following studies were reviewed:     MRI CERVICAL SPINE 12/20/2022  Multilevel spondylosis with the following notable findings:  1. C6-7 moderate to severe central stenosis with subtle cord impingement, also moderate C5-6 and mild to moderate C4-5 with cord contouring.  2. Chronic foraminal stenosis, at least moderate to severe left C4-5, left C5-6, right C6-7.  3. Moderate/severe disc degeneration from C4-5 to C6-7, with mild C4-5 degenerative endplate edema.  4. Multilevel facet degeneration without active inflammation.    MRI CERVICAL SPINE 8/7/2021  1.  At C4-C5, there is severe left neural foraminal stenosis.  2.  At C5-C6, a left paracentral protrusion compresses the left hemicord. There is severe left neural foraminal stenosis.  3.  At C6-C7, there is severe spinal canal stenosis with flattening and deformity of the cord. There is moderate right neural foraminal stenosis.  4.  No convincing cord signal abnormalities.  5.  Additional findings as above     (05/10/2017 09:02 CDT MRI Report)  1. No recent infarct, intracranial mass or abnormal intracranial enhancement.  2. No structural or migrational abnormality identified.  3. Within the limits of motion on the study, the mesial temporal structures are within normal limits bilaterally.  4. Stable mild number of small foci of nonspecific nonenhancing T2 signal changes in the supratentorial white matter which is not atypical in a patient of this age. These may reflect foci of nonspecific gliosis or chronic myelin loss or mild chronic small vessel ischemic changes.    EEG 5/9/17: This is abnormal sleep deprived EEG due to paroxysmal slowing of the background in theta range that is maximally expressed in the left hemisphere and suggests nonspecific cerebral dysfunction with predominantly left hemispheric pathology. No sharp  activity or rhythmic discharges were seen.     PERTINENT LABS  Following labs were reviewed:  No visits with results within 3 Month(s) from this visit.   Latest known visit with results is:   Lab Requisition on 10/13/2023   Component Date Value Ref Range Status     Culture 10/13/2023 No beta hemolytic Streptococcus isolated   Final         Total time spent for face to face visit, reviewing labs/imaging studies, counseling and coordination of care was: 30 Minutes spent on the date of the encounter doing chart review, review of outside records, review of test results, interpretation of tests, patient visit, and documentation     The longitudinal plan of care for the diagnosis(es)/condition(s) as documented were addressed during this visit. Due to the added complexity in care, I will continue to support Yuliya in the subsequent management and with ongoing continuity of care.    This note was dictated using voice recognition software.  Any grammatical or context distortions are unintentional and inherent to the software.    No orders of the defined types were placed in this encounter.     New Prescriptions    No medications on file     Modified Medications    No medications on file                 Again, thank you for allowing me to participate in the care of your patient.        Sincerely,        Jim Hahn MD

## 2024-03-29 NOTE — NURSING NOTE
Chief Complaint   Patient presents with    Seizures     Annual follow up- no new concerns      Glo Foreman CMA on 3/29/2024 at 12:02 PM  [facility]

## 2024-03-30 LAB — LAMOTRIGINE SERPL-MCNC: 7.6 UG/ML

## 2024-05-04 ENCOUNTER — LAB REQUISITION (OUTPATIENT)
Dept: LAB | Facility: CLINIC | Age: 64
End: 2024-05-04

## 2024-05-04 DIAGNOSIS — N89.8 OTHER SPECIFIED NONINFLAMMATORY DISORDERS OF VAGINA: ICD-10-CM

## 2024-05-04 DIAGNOSIS — M54.50 LOW BACK PAIN, UNSPECIFIED: ICD-10-CM

## 2024-05-04 PROCEDURE — 87086 URINE CULTURE/COLONY COUNT: CPT | Performed by: STUDENT IN AN ORGANIZED HEALTH CARE EDUCATION/TRAINING PROGRAM

## 2024-05-04 PROCEDURE — 0352U MULTIPLEX VAGINAL PANEL BY PCR: CPT | Performed by: STUDENT IN AN ORGANIZED HEALTH CARE EDUCATION/TRAINING PROGRAM

## 2024-05-05 LAB
BACTERIA UR CULT: NORMAL
BACTERIAL VAGINOSIS VAG-IMP: NEGATIVE
CANDIDA DNA VAG QL NAA+PROBE: NOT DETECTED
CANDIDA GLABRATA / CANDIDA KRUSEI DNA: NOT DETECTED
T VAGINALIS DNA VAG QL NAA+PROBE: NOT DETECTED

## 2024-07-09 ENCOUNTER — LAB REQUISITION (OUTPATIENT)
Dept: LAB | Facility: CLINIC | Age: 64
End: 2024-07-09

## 2024-07-09 DIAGNOSIS — R39.9 UNSPECIFIED SYMPTOMS AND SIGNS INVOLVING THE GENITOURINARY SYSTEM: ICD-10-CM

## 2024-07-09 PROCEDURE — 87086 URINE CULTURE/COLONY COUNT: CPT | Performed by: PHYSICIAN ASSISTANT

## 2024-07-11 LAB — BACTERIA UR CULT: NORMAL

## 2024-10-31 ENCOUNTER — LAB REQUISITION (OUTPATIENT)
Dept: LAB | Facility: CLINIC | Age: 64
End: 2024-10-31

## 2024-10-31 DIAGNOSIS — K92.1 MELENA: ICD-10-CM

## 2024-10-31 LAB
BASOPHILS # BLD AUTO: 0.1 10E3/UL (ref 0–0.2)
BASOPHILS NFR BLD AUTO: 2 %
EOSINOPHIL # BLD AUTO: 0.1 10E3/UL (ref 0–0.7)
EOSINOPHIL NFR BLD AUTO: 3 %
ERYTHROCYTE [DISTWIDTH] IN BLOOD BY AUTOMATED COUNT: 12.7 % (ref 10–15)
HCT VFR BLD AUTO: 35.7 % (ref 35–47)
HGB BLD-MCNC: 11.9 G/DL (ref 11.7–15.7)
IMM GRANULOCYTES # BLD: 0 10E3/UL
IMM GRANULOCYTES NFR BLD: 0 %
LYMPHOCYTES # BLD AUTO: 1.3 10E3/UL (ref 0.8–5.3)
LYMPHOCYTES NFR BLD AUTO: 26 %
MCH RBC QN AUTO: 31.1 PG (ref 26.5–33)
MCHC RBC AUTO-ENTMCNC: 33.3 G/DL (ref 31.5–36.5)
MCV RBC AUTO: 93 FL (ref 78–100)
MONOCYTES # BLD AUTO: 0.4 10E3/UL (ref 0–1.3)
MONOCYTES NFR BLD AUTO: 8 %
NEUTROPHILS # BLD AUTO: 3 10E3/UL (ref 1.6–8.3)
NEUTROPHILS NFR BLD AUTO: 62 %
NRBC # BLD AUTO: 0 10E3/UL
NRBC BLD AUTO-RTO: 0 /100
PLATELET # BLD AUTO: 240 10E3/UL (ref 150–450)
RBC # BLD AUTO: 3.83 10E6/UL (ref 3.8–5.2)
WBC # BLD AUTO: 4.8 10E3/UL (ref 4–11)

## 2024-10-31 PROCEDURE — 85018 HEMOGLOBIN: CPT | Performed by: FAMILY MEDICINE

## 2024-10-31 PROCEDURE — 85004 AUTOMATED DIFF WBC COUNT: CPT | Performed by: FAMILY MEDICINE

## 2024-10-31 PROCEDURE — 82947 ASSAY GLUCOSE BLOOD QUANT: CPT | Performed by: FAMILY MEDICINE

## 2024-11-01 LAB
ALBUMIN SERPL BCG-MCNC: 4.3 G/DL (ref 3.5–5.2)
ALP SERPL-CCNC: 55 U/L (ref 40–150)
ALT SERPL W P-5'-P-CCNC: 14 U/L (ref 0–50)
ANION GAP SERPL CALCULATED.3IONS-SCNC: 12 MMOL/L (ref 7–15)
AST SERPL W P-5'-P-CCNC: 16 U/L (ref 0–45)
BILIRUB SERPL-MCNC: 0.3 MG/DL
BUN SERPL-MCNC: 8.1 MG/DL (ref 8–23)
CALCIUM SERPL-MCNC: 9.4 MG/DL (ref 8.8–10.4)
CHLORIDE SERPL-SCNC: 101 MMOL/L (ref 98–107)
CREAT SERPL-MCNC: 0.79 MG/DL (ref 0.51–0.95)
EGFRCR SERPLBLD CKD-EPI 2021: 84 ML/MIN/1.73M2
GLUCOSE SERPL-MCNC: 127 MG/DL (ref 70–99)
HCO3 SERPL-SCNC: 25 MMOL/L (ref 22–29)
POTASSIUM SERPL-SCNC: 3.6 MMOL/L (ref 3.4–5.3)
PROT SERPL-MCNC: 6.4 G/DL (ref 6.4–8.3)
SODIUM SERPL-SCNC: 138 MMOL/L (ref 135–145)

## 2024-11-08 ENCOUNTER — LAB REQUISITION (OUTPATIENT)
Dept: LAB | Facility: CLINIC | Age: 64
End: 2024-11-08

## 2024-11-08 DIAGNOSIS — E78.2 MIXED HYPERLIPIDEMIA: ICD-10-CM

## 2024-11-08 LAB
CHOLEST SERPL-MCNC: 208 MG/DL
FASTING STATUS PATIENT QL REPORTED: ABNORMAL
HDLC SERPL-MCNC: 110 MG/DL
LDLC SERPL CALC-MCNC: 85 MG/DL
NONHDLC SERPL-MCNC: 98 MG/DL
TRIGL SERPL-MCNC: 63 MG/DL

## 2024-11-08 PROCEDURE — 82465 ASSAY BLD/SERUM CHOLESTEROL: CPT | Performed by: FAMILY MEDICINE

## 2024-11-11 ENCOUNTER — PATIENT OUTREACH (OUTPATIENT)
Dept: CARE COORDINATION | Facility: CLINIC | Age: 64
End: 2024-11-11
Payer: COMMERCIAL

## 2024-12-06 ENCOUNTER — HOSPITAL ENCOUNTER (OUTPATIENT)
Dept: CT IMAGING | Facility: HOSPITAL | Age: 64
Discharge: HOME | End: 2024-12-06
Attending: FAMILY MEDICINE
Payer: COMMERCIAL

## 2024-12-06 ENCOUNTER — ANCILLARY PROCEDURE (OUTPATIENT)
Dept: MAMMOGRAPHY | Facility: HOSPITAL | Age: 64
End: 2024-12-06
Attending: FAMILY MEDICINE
Payer: COMMERCIAL

## 2024-12-06 DIAGNOSIS — K92.1 BLACK STOOL: ICD-10-CM

## 2024-12-06 DIAGNOSIS — Z12.31 VISIT FOR SCREENING MAMMOGRAM: ICD-10-CM

## 2024-12-06 PROCEDURE — 74176 CT ABD & PELVIS W/O CONTRAST: CPT

## 2024-12-06 PROCEDURE — 77063 BREAST TOMOSYNTHESIS BI: CPT

## 2025-03-27 NOTE — PROGRESS NOTES
NEUROLOGY FOLLOW UP VISIT  NOTE       [facility] [place]  [address] [place] [address]  Tel: [phone]  Fax: [phone]  [email or web address]     Yuliya Feliz,  1960, MRN [id number]  PCP: [facility]  Date: 2025      ASSESSMENT & PLAN     Visit Diagnosis  Epilepsy with partial complex seizures (H)     Complex partial seizures  65-year-old female with C4-C7 anterior discectomy with fusion, HLD, GERD, complex partial seizures with previous EEG showing left temporal sharp discharges and asymmetry of hippocampus (smaller on the left) who returns for follow-up.  She denies any seizures since her last visit.  I have recommended:    1.  Continue lamotrigine 200 mg twice daily prescriptions were filled for next year  2.  Check lamotrigine level at hepatic panel  3.  Follow-up in 1 year    Thank you again for this referral, please feel free to contact me if you have any questions.    Jim Hahn MD  [facility] NEUROLOGY, [place]     HISTORY OF PRESENT ILLNESS     Patient is a 65-year-old female with history of C6/C7 stenosis s/p C4-C7 anterior discectomy with fusion complicated by dysphagia, HLD, GERD, complex partial seizures who returns for follow-up.  Her seizures are well-controlled on lamotrigine 200 mg twice daily and during her last visit on 3/29/2024 she was continued on that dose.  Lamotrigine level was therapeutic at 7.6.  Since her last visit she denies any episodes of loss of consciousness.  She occasionally notices pain in her tailbone and suspects she get it because she sits for long period of time    BRIEFLY patient is a female with a history of hyperlipidemia and gastroesophageal reflux who is being followed in our clinic for complex partial seizure disorder. Her symptoms started in  when she had a nocturnal seizure and at that time had MRI and EEG that were unremarkable. She was started on Keppra for a short duration and afterwards  it was discontinued. She was doing fine until 2014 when she woke up, and around 8:40 a.m. she went into the bathroom, and afterwards her  heard her hitting the floor. He found her unconscious but  no  tonic-clonic activity. She was unconscious for about 10 minutes and was confused. She was taken to the hospital and had a CT of the head that was unremarkable. EEG at that time showed dysrhythmia grade III left temporal and MRI of the head showed the left hippocampal body slightly smaller than the right and likely a normal variant. She was started on Lamictal with good control of seizures     PROBLEM LIST   Patient Active Problem List   Diagnosis    Complex Partial Seizure (Dysrhythmia III Lt. Temporal)    Gastroesophageal reflux disease    Hyperlipidemia    Severe C6-C7 stenosis    Deep vein thrombosis (DVT) of lower extremity (H)         PAST MEDICAL & SURGICAL HISTORY     Past Medical History:   Patient  has a past medical history of Seizures (H).    Surgical History:  She  has a past surgical history that includes GYN surgery.     SOCIAL HISTORY     Reviewed, and she  reports that she has never smoked. She has never used smokeless tobacco. She reports that she does not currently use alcohol. She reports that she does not use drugs.     FAMILY HISTORY     Reviewed, and family history includes Alcoholism in her father; Cancer in her brother and mother; Parkinsonism in her paternal aunt; Seizure Disorder in her father.     ALLERGIES     Allergies   Allergen Reactions    Rivaroxaban Palpitations and Shortness Of Breath    Cephalexin     Latex     Naproxen          REVIEW OF SYSTEMS     A 12 point review of system was performed and was negative except as outlined in the history of present illness.     HOME MEDICATIONS     Current Outpatient Rx   Medication Sig Dispense Refill    aspirin 81 MG EC tablet Take 81 mg by mouth daily      Black Cohosh 160 MG CAPS       CALCIUM PO       Cholecalciferol (VITAMIN D3 PO)  "  0 Refill(s), Type: Maintenance      lamoTRIgine (LAMICTAL) 200 MG tablet Take 1 tablet (200 mg) by mouth 2 times daily 180 tablet 3    magnesium oxide (MAG-OX) 100 mg TABS quarter-tab       melatonin 1 MG CAPS       simvastatin (ZOCOR) 40 MG tablet Take 40 mg by mouth at bedtime      VITAMIN B COMPLEX-C PO            PHYSICAL EXAM     Vital signs  /73 (BP Location: Left arm, Patient Position: Sitting)   Pulse 63   Ht 1.575 m (5' 2\")   Wt 55.8 kg (123 lb)   BMI 22.50 kg/m      Weight:   123 lbs 0 oz    Patient is alert and oriented x4 in no acute distress. Vital signs were reviewed and are documented in electronic medical record. Neck was supple, no carotid bruits, thyromegaly, JVD, or lymphadenopathy was noted.   NEUROLOGY EXAM:   Patient s speech was normal with no aphasia or dysarthria. Mentation, and affect were also normal.    Funduscopic exam was normal, with normal cup to disc ratio. Cranial nerves II -XII were intact.    Patient had normal mass, tone and motor strength was 5/5 in all extremities without pronator drift.    Sensation was intact to light touch, pinprick, and vibratory sensation.    Reflexes were 1+ symmetrical with downgoing toes.    No dysmetria noted on FNF or HKS. Romberg was negative.   Gait testing was normal. Able to walk on toes/heels. Tandem walk normal.     PERTINENT DIAGNOSTIC STUDIES     Following studies were reviewed:     MRI CERVICAL SPINE 12/20/2022  Multilevel spondylosis with the following notable findings:  1. C6-7 moderate to severe central stenosis with subtle cord impingement, also moderate C5-6 and mild to moderate C4-5 with cord contouring.  2. Chronic foraminal stenosis, at least moderate to severe left C4-5, left C5-6, right C6-7.  3. Moderate/severe disc degeneration from C4-5 to C6-7, with mild C4-5 degenerative endplate edema.  4. Multilevel facet degeneration without active inflammation.     MRI CERVICAL SPINE 8/7/2021  1.  At C4-C5, there is severe left " neural foraminal stenosis.  2.  At C5-C6, a left paracentral protrusion compresses the left hemicord. There is severe left neural foraminal stenosis.  3.  At C6-C7, there is severe spinal canal stenosis with flattening and deformity of the cord. There is moderate right neural foraminal stenosis.  4.  No convincing cord signal abnormalities.  5.  Additional findings as above     (05/10/2017 09:02 CDT MRI Report)  1. No recent infarct, intracranial mass or abnormal intracranial enhancement.  2. No structural or migrational abnormality identified.  3. Within the limits of motion on the study, the mesial temporal structures are within normal limits bilaterally.  4. Stable mild number of small foci of nonspecific nonenhancing T2 signal changes in the supratentorial white matter which is not atypical in a patient of this age. These may reflect foci of nonspecific gliosis or chronic myelin loss or mild chronic small vessel ischemic changes.    EEG 5/9/17: This is abnormal sleep deprived EEG due to paroxysmal slowing of the background in theta range that is maximally expressed in the left hemisphere and suggests nonspecific cerebral dysfunction with predominantly left hemispheric pathology. No sharp activity or rhythmic discharges were seen.    SWALLOW STUDY 1/5/2024  1.  Minimal penetration with straw sip of thin barium. No aspiration.  2.  Delayed passage of a 13 mm barium tablet through the cervical esophagus as described above.     PERTINENT LABS  Following labs were reviewed:  No visits with results within 3 Month(s) from this visit.   Latest known visit with results is:   Lab Requisition on 11/08/2024   Component Date Value Ref Range Status    Cholesterol 11/08/2024 208 (H)  <200 mg/dL Final    Triglycerides 11/08/2024 63  <150 mg/dL Final    Direct Measure HDL 11/08/2024 110  >=50 mg/dL Final    LDL Cholesterol Calculated 11/08/2024 85  <100 mg/dL Final    Non HDL Cholesterol 11/08/2024 98  <130 mg/dL Final     Patient Fasting > 8hrs? 11/08/2024 Unknown   Final         Total time spent for face to face visit, reviewing labs/imaging studies, counseling and coordination of care was: 30 Minutes spent on the date of the encounter doing chart review, review of outside records, review of test results, interpretation of tests, patient visit, and documentation     The longitudinal plan of care for the diagnosis(es)/condition(s) as documented were addressed during this visit. Due to the added complexity in care, I will continue to support Yuliya in the subsequent management and with ongoing continuity of care.    This note was dictated using voice recognition software.  Any grammatical or context distortions are unintentional and inherent to the software.    No orders of the defined types were placed in this encounter.     New Prescriptions    No medications on file     Modified Medications    No medications on file

## 2025-04-01 ENCOUNTER — OFFICE VISIT (OUTPATIENT)
Dept: NEUROLOGY | Facility: CLINIC | Age: 65
End: 2025-04-01
Payer: COMMERCIAL

## 2025-04-01 ENCOUNTER — LAB (OUTPATIENT)
Dept: LAB | Facility: HOSPITAL | Age: 65
End: 2025-04-01
Payer: COMMERCIAL

## 2025-04-01 VITALS
BODY MASS INDEX: 22.63 KG/M2 | HEART RATE: 63 BPM | DIASTOLIC BLOOD PRESSURE: 73 MMHG | SYSTOLIC BLOOD PRESSURE: 117 MMHG | HEIGHT: 62 IN | WEIGHT: 123 LBS

## 2025-04-01 DIAGNOSIS — G40.209 EPILEPSY WITH PARTIAL COMPLEX SEIZURES (H): Primary | ICD-10-CM

## 2025-04-01 DIAGNOSIS — G40.209 EPILEPSY WITH PARTIAL COMPLEX SEIZURES (H): ICD-10-CM

## 2025-04-01 PROBLEM — M48.02 CERVICAL STENOSIS OF SPINAL CANAL: Status: ACTIVE | Noted: 2021-08-09

## 2025-04-01 LAB
ALBUMIN SERPL BCG-MCNC: 4.3 G/DL (ref 3.5–5.2)
ALP SERPL-CCNC: 74 U/L (ref 40–150)
ALT SERPL W P-5'-P-CCNC: 12 U/L (ref 0–50)
AST SERPL W P-5'-P-CCNC: 17 U/L (ref 0–45)
BILIRUB DIRECT SERPL-MCNC: <0.08 MG/DL (ref 0–0.3)
BILIRUB SERPL-MCNC: <0.2 MG/DL
PROT SERPL-MCNC: 6.9 G/DL (ref 6.4–8.3)

## 2025-04-01 PROCEDURE — 80175 DRUG SCREEN QUAN LAMOTRIGINE: CPT

## 2025-04-01 PROCEDURE — G2211 COMPLEX E/M VISIT ADD ON: HCPCS | Performed by: PSYCHIATRY & NEUROLOGY

## 2025-04-01 PROCEDURE — 84155 ASSAY OF PROTEIN SERUM: CPT

## 2025-04-01 PROCEDURE — 3074F SYST BP LT 130 MM HG: CPT | Performed by: PSYCHIATRY & NEUROLOGY

## 2025-04-01 PROCEDURE — 36415 COLL VENOUS BLD VENIPUNCTURE: CPT

## 2025-04-01 PROCEDURE — 99214 OFFICE O/P EST MOD 30 MIN: CPT | Performed by: PSYCHIATRY & NEUROLOGY

## 2025-04-01 PROCEDURE — 84075 ASSAY ALKALINE PHOSPHATASE: CPT

## 2025-04-01 PROCEDURE — 3078F DIAST BP <80 MM HG: CPT | Performed by: PSYCHIATRY & NEUROLOGY

## 2025-04-01 RX ORDER — LAMOTRIGINE 200 MG/1
200 TABLET ORAL 2 TIMES DAILY
Qty: 180 TABLET | Refills: 3 | Status: SHIPPED | OUTPATIENT
Start: 2025-04-01

## 2025-04-01 NOTE — LETTER
2025      Yuliya Feliz  [address] [place] [address]      Dear Colleague,    Thank you for referring your patient, Yuliya Feliz, to the [facility]UROLOG[facility] [place]. Please see a copy of my visit note below.    NEUROLOGY FOLLOW UP VISIT  NOTE       [facility] NEUROLOGY [place]  [address] [place] [address]  Tel: [phone]  Fax: [phone]  [email or web address]     Yuliya Feliz,  1960, MRN [id number]  PCP: [facility]  Date: 2025      ASSESSMENT & PLAN     Visit Diagnosis  Epilepsy with partial complex seizures (H)     Complex partial seizures  65-year-old female with C4-C7 anterior discectomy with fusion, HLD, GERD, complex partial seizures with previous EEG showing left temporal sharp discharges and asymmetry of hippocampus (smaller on the left) who returns for follow-up.  She denies any seizures since her last visit.  I have recommended:    1.  Continue lamotrigine 200 mg twice daily prescriptions were filled for next year  2.  Check lamotrigine level at hepatic panel  3.  Follow-up in 1 year    Thank you again for this referral, please feel free to contact me if you have any questions.    Jim Hahn MD  [facility] NEUROLOGY, [place]     HISTORY OF PRESENT ILLNESS     Patient is a 65-year-old female with history of C6/C7 stenosis s/p C4-C7 anterior discectomy with fusion complicated by dysphagia, HLD, GERD, complex partial seizures who returns for follow-up.  Her seizures are well-controlled on lamotrigine 200 mg twice daily and during her last visit on 3/29/2024 she was continued on that dose.  Lamotrigine level was therapeutic at 7.6.  Since her last visit she denies any episodes of loss of consciousness.  She occasionally notices pain in her tailbone and suspects she get it because she sits for long period of time    BRIEFLY patient is a female with a history of hyperlipidemia and gastroesophageal reflux who is being  followed in our clinic for complex partial seizure disorder. Her symptoms started in 2010 when she had a nocturnal seizure and at that time had MRI and EEG that were unremarkable. She was started on Keppra for a short duration and afterwards it was discontinued. She was doing fine until 2014 when she woke up, and around 8:40 a.m. she went into the bathroom, and afterwards her  heard her hitting the floor. He found her unconscious but  no  tonic-clonic activity. She was unconscious for about 10 minutes and was confused. She was taken to the hospital and had a CT of the head that was unremarkable. EEG at that time showed dysrhythmia grade III left temporal and MRI of the head showed the left hippocampal body slightly smaller than the right and likely a normal variant. She was started on Lamictal with good control of seizures     PROBLEM LIST   Patient Active Problem List   Diagnosis     Complex Partial Seizure (Dysrhythmia III Lt. Temporal)     Gastroesophageal reflux disease     Hyperlipidemia     Severe C6-C7 stenosis     Deep vein thrombosis (DVT) of lower extremity (H)         PAST MEDICAL & SURGICAL HISTORY     Past Medical History:   Patient  has a past medical history of Seizures (H).    Surgical History:  She  has a past surgical history that includes GYN surgery.     SOCIAL HISTORY     Reviewed, and she  reports that she has never smoked. She has never used smokeless tobacco. She reports that she does not currently use alcohol. She reports that she does not use drugs.     FAMILY HISTORY     Reviewed, and family history includes Alcoholism in her father; Cancer in her brother and mother; Parkinsonism in her paternal aunt; Seizure Disorder in her father.     ALLERGIES     Allergies   Allergen Reactions     Rivaroxaban Palpitations and Shortness Of Breath     Cephalexin      Latex      Naproxen          REVIEW OF SYSTEMS     A 12 point review of system was performed and was negative except as outlined in  "the history of present illness.     HOME MEDICATIONS     Current Outpatient Rx   Medication Sig Dispense Refill     aspirin 81 MG EC tablet Take 81 mg by mouth daily       Black Cohosh 160 MG CAPS        CALCIUM PO        Cholecalciferol (VITAMIN D3 PO)   0 Refill(s), Type: Maintenance       lamoTRIgine (LAMICTAL) 200 MG tablet Take 1 tablet (200 mg) by mouth 2 times daily 180 tablet 3     magnesium oxide (MAG-OX) 100 mg TABS quarter-tab        melatonin 1 MG CAPS        simvastatin (ZOCOR) 40 MG tablet Take 40 mg by mouth at bedtime       VITAMIN B COMPLEX-C PO            PHYSICAL EXAM     Vital signs  /73 (BP Location: Left arm, Patient Position: Sitting)   Pulse 63   Ht 1.575 m (5' 2\")   Wt 55.8 kg (123 lb)   BMI 22.50 kg/m      Weight:   123 lbs 0 oz    Patient is alert and oriented x4 in no acute distress. Vital signs were reviewed and are documented in electronic medical record. Neck was supple, no carotid bruits, thyromegaly, JVD, or lymphadenopathy was noted.   NEUROLOGY EXAM:    Patient s speech was normal with no aphasia or dysarthria. Mentation, and affect were also normal.     Funduscopic exam was normal, with normal cup to disc ratio. Cranial nerves II -XII were intact.     Patient had normal mass, tone and motor strength was 5/5 in all extremities without pronator drift.     Sensation was intact to light touch, pinprick, and vibratory sensation.     Reflexes were 1+ symmetrical with downgoing toes.     No dysmetria noted on FNF or HKS. Romberg was negative.    Gait testing was normal. Able to walk on toes/heels. Tandem walk normal.     PERTINENT DIAGNOSTIC STUDIES     Following studies were reviewed:     MRI CERVICAL SPINE 12/20/2022  Multilevel spondylosis with the following notable findings:  1. C6-7 moderate to severe central stenosis with subtle cord impingement, also moderate C5-6 and mild to moderate C4-5 with cord contouring.  2. Chronic foraminal stenosis, at least moderate to severe " left C4-5, left C5-6, right C6-7.  3. Moderate/severe disc degeneration from C4-5 to C6-7, with mild C4-5 degenerative endplate edema.  4. Multilevel facet degeneration without active inflammation.     MRI CERVICAL SPINE 8/7/2021  1.  At C4-C5, there is severe left neural foraminal stenosis.  2.  At C5-C6, a left paracentral protrusion compresses the left hemicord. There is severe left neural foraminal stenosis.  3.  At C6-C7, there is severe spinal canal stenosis with flattening and deformity of the cord. There is moderate right neural foraminal stenosis.  4.  No convincing cord signal abnormalities.  5.  Additional findings as above     (05/10/2017 09:02 CDT MRI Report)  1. No recent infarct, intracranial mass or abnormal intracranial enhancement.  2. No structural or migrational abnormality identified.  3. Within the limits of motion on the study, the mesial temporal structures are within normal limits bilaterally.  4. Stable mild number of small foci of nonspecific nonenhancing T2 signal changes in the supratentorial white matter which is not atypical in a patient of this age. These may reflect foci of nonspecific gliosis or chronic myelin loss or mild chronic small vessel ischemic changes.    EEG 5/9/17: This is abnormal sleep deprived EEG due to paroxysmal slowing of the background in theta range that is maximally expressed in the left hemisphere and suggests nonspecific cerebral dysfunction with predominantly left hemispheric pathology. No sharp activity or rhythmic discharges were seen.    SWALLOW STUDY 1/5/2024  1.  Minimal penetration with straw sip of thin barium. No aspiration.  2.  Delayed passage of a 13 mm barium tablet through the cervical esophagus as described above.     PERTINENT LABS  Following labs were reviewed:  No visits with results within 3 Month(s) from this visit.   Latest known visit with results is:   Lab Requisition on 11/08/2024   Component Date Value Ref Range Status     Cholesterol  11/08/2024 208 (H)  <200 mg/dL Final     Triglycerides 11/08/2024 63  <150 mg/dL Final     Direct Measure HDL 11/08/2024 110  >=50 mg/dL Final     LDL Cholesterol Calculated 11/08/2024 85  <100 mg/dL Final     Non HDL Cholesterol 11/08/2024 98  <130 mg/dL Final     Patient Fasting > 8hrs? 11/08/2024 Unknown   Final         Total time spent for face to face visit, reviewing labs/imaging studies, counseling and coordination of care was: 30 Minutes spent on the date of the encounter doing chart review, review of outside records, review of test results, interpretation of tests, patient visit, and documentation     The longitudinal plan of care for the diagnosis(es)/condition(s) as documented were addressed during this visit. Due to the added complexity in care, I will continue to support Yuliya in the subsequent management and with ongoing continuity of care.    This note was dictated using voice recognition software.  Any grammatical or context distortions are unintentional and inherent to the software.    No orders of the defined types were placed in this encounter.     New Prescriptions    No medications on file     Modified Medications    No medications on file                 Again, thank you for allowing me to participate in the care of your patient.        Sincerely,        Jim Hahn MD    Electronically signed

## 2025-04-01 NOTE — NURSING NOTE
Chief Complaint   Patient presents with    Seizures     Annual follow up. Pt has not had any seizures. DMV form has already been completed.     Michelle Abernathy LPN on 4/1/2025 at 11:49 AM

## 2025-04-02 LAB — LAMOTRIGINE SERPL-MCNC: 10.9 UG/ML
